# Patient Record
Sex: FEMALE | Race: OTHER | HISPANIC OR LATINO | Employment: FULL TIME | URBAN - METROPOLITAN AREA
[De-identification: names, ages, dates, MRNs, and addresses within clinical notes are randomized per-mention and may not be internally consistent; named-entity substitution may affect disease eponyms.]

---

## 2017-02-08 ENCOUNTER — HOSPITAL ENCOUNTER (EMERGENCY)
Facility: HOSPITAL | Age: 27
Discharge: HOME | End: 2017-02-08
Attending: EMERGENCY MEDICINE

## 2017-02-08 ENCOUNTER — APPOINTMENT (EMERGENCY)
Dept: RADIOLOGY | Facility: HOSPITAL | Age: 27
End: 2017-02-08

## 2017-02-08 VITALS
RESPIRATION RATE: 17 BRPM | OXYGEN SATURATION: 100 % | DIASTOLIC BLOOD PRESSURE: 86 MMHG | SYSTOLIC BLOOD PRESSURE: 126 MMHG | TEMPERATURE: 96.3 F | HEART RATE: 70 BPM

## 2017-02-08 DIAGNOSIS — N12 PYELONEPHRITIS: Primary | ICD-10-CM

## 2017-02-08 LAB
BACTERIA UR QL AUTO: ABNORMAL /HPF
BILIRUB UR QL STRIP: NEGATIVE
CLARITY UR: CLEAR
COLOR UR: YELLOW
COLOR, POC: NORMAL
GLUCOSE UR STRIP-MCNC: NEGATIVE MG/DL
HCG UR QL: NEGATIVE
HGB UR QL STRIP.AUTO: ABNORMAL
HYALINE CASTS #/AREA URNS LPF: ABNORMAL /LPF
KETONES UR STRIP-MCNC: NEGATIVE MG/DL
LEUKOCYTE ESTERASE UR QL STRIP: ABNORMAL
NITRITE UR QL STRIP: NEGATIVE
NON-SQ EPI CELLS URNS QL MICRO: ABNORMAL /HPF
PH UR STRIP.AUTO: 7 [PH] (ref 4.5–8)
PROT UR STRIP-MCNC: NEGATIVE MG/DL
RBC #/AREA URNS AUTO: ABNORMAL /HPF
SP GR UR STRIP.AUTO: 1.02 (ref 1–1.03)
UROBILINOGEN UR QL STRIP.AUTO: 1 E.U./DL
WBC #/AREA URNS AUTO: ABNORMAL /HPF

## 2017-02-08 PROCEDURE — 99284 EMERGENCY DEPT VISIT MOD MDM: CPT

## 2017-02-08 PROCEDURE — 87077 CULTURE AEROBIC IDENTIFY: CPT

## 2017-02-08 PROCEDURE — 81001 URINALYSIS AUTO W/SCOPE: CPT

## 2017-02-08 PROCEDURE — 87086 URINE CULTURE/COLONY COUNT: CPT

## 2017-02-08 PROCEDURE — 96372 THER/PROPH/DIAG INJ SC/IM: CPT

## 2017-02-08 PROCEDURE — 81002 URINALYSIS NONAUTO W/O SCOPE: CPT | Performed by: EMERGENCY MEDICINE

## 2017-02-08 PROCEDURE — 81025 URINE PREGNANCY TEST: CPT | Performed by: EMERGENCY MEDICINE

## 2017-02-08 PROCEDURE — 74176 CT ABD & PELVIS W/O CONTRAST: CPT

## 2017-02-08 PROCEDURE — 87186 SC STD MICRODIL/AGAR DIL: CPT

## 2017-02-08 RX ORDER — KETOROLAC TROMETHAMINE 30 MG/ML
15 INJECTION, SOLUTION INTRAMUSCULAR; INTRAVENOUS ONCE
Status: COMPLETED | OUTPATIENT
Start: 2017-02-08 | End: 2017-02-08

## 2017-02-08 RX ORDER — NAPROXEN 500 MG/1
500 TABLET ORAL 2 TIMES DAILY WITH MEALS
Qty: 60 TABLET | Refills: 0 | Status: SHIPPED | OUTPATIENT
Start: 2017-02-08 | End: 2017-02-24

## 2017-02-08 RX ORDER — CEPHALEXIN 500 MG/1
500 CAPSULE ORAL 4 TIMES DAILY
Qty: 40 CAPSULE | Refills: 0 | Status: SHIPPED | OUTPATIENT
Start: 2017-02-08 | End: 2017-02-18

## 2017-02-08 RX ORDER — ONDANSETRON 4 MG/1
4 TABLET, ORALLY DISINTEGRATING ORAL EVERY 8 HOURS PRN
Qty: 9 TABLET | Refills: 0 | Status: SHIPPED | OUTPATIENT
Start: 2017-02-08 | End: 2017-02-24

## 2017-02-08 RX ADMIN — KETOROLAC TROMETHAMINE 15 MG: 30 INJECTION, SOLUTION INTRAMUSCULAR at 01:21

## 2017-02-11 LAB — BACTERIA UR CULT: NORMAL

## 2017-02-24 ENCOUNTER — APPOINTMENT (EMERGENCY)
Dept: RADIOLOGY | Facility: HOSPITAL | Age: 27
End: 2017-02-24

## 2017-02-24 ENCOUNTER — HOSPITAL ENCOUNTER (EMERGENCY)
Facility: HOSPITAL | Age: 27
Discharge: HOME | End: 2017-02-24
Attending: EMERGENCY MEDICINE | Admitting: EMERGENCY MEDICINE

## 2017-02-24 VITALS
RESPIRATION RATE: 17 BRPM | OXYGEN SATURATION: 99 % | WEIGHT: 190 LBS | SYSTOLIC BLOOD PRESSURE: 140 MMHG | TEMPERATURE: 98.2 F | HEART RATE: 91 BPM | DIASTOLIC BLOOD PRESSURE: 84 MMHG

## 2017-02-24 DIAGNOSIS — R22.0 MANDIBULAR MASS: Primary | ICD-10-CM

## 2017-02-24 DIAGNOSIS — K04.7 DENTAL ABSCESS: ICD-10-CM

## 2017-02-24 LAB
ANION GAP BLD CALC-SCNC: 19 MMOL/L (ref 4–13)
BASOPHILS # BLD AUTO: 0.02 THOUSANDS/ΜL (ref 0–0.1)
BASOPHILS NFR BLD AUTO: 0 % (ref 0–1)
BUN BLD-MCNC: 13 MG/DL (ref 5–25)
CA-I BLD-SCNC: 1.2 MMOL/L (ref 1.12–1.32)
CHLORIDE BLD-SCNC: 102 MMOL/L (ref 100–108)
CREAT BLD-MCNC: 0.6 MG/DL (ref 0.6–1.3)
EOSINOPHIL # BLD AUTO: 0.04 THOUSAND/ΜL (ref 0–0.61)
EOSINOPHIL NFR BLD AUTO: 0 % (ref 0–6)
ERYTHROCYTE [DISTWIDTH] IN BLOOD BY AUTOMATED COUNT: 13.4 % (ref 11.6–15.1)
GFR SERPL CREATININE-BSD FRML MDRD: >60 ML/MIN/1.73SQ M
GLUCOSE SERPL-MCNC: 93 MG/DL (ref 65–140)
HCT VFR BLD AUTO: 39.3 % (ref 34.8–46.1)
HCT VFR BLD CALC: 41 % (ref 34.8–46.1)
HGB BLD-MCNC: 12.8 G/DL (ref 11.5–15.4)
HGB BLDA-MCNC: 13.9 G/DL (ref 11.5–15.4)
LYMPHOCYTES # BLD AUTO: 2.03 THOUSANDS/ΜL (ref 0.6–4.47)
LYMPHOCYTES NFR BLD AUTO: 19 % (ref 14–44)
MCH RBC QN AUTO: 29.3 PG (ref 26.8–34.3)
MCHC RBC AUTO-ENTMCNC: 32.6 G/DL (ref 31.4–37.4)
MCV RBC AUTO: 90 FL (ref 82–98)
MONOCYTES # BLD AUTO: 1.14 THOUSAND/ΜL (ref 0.17–1.22)
MONOCYTES NFR BLD AUTO: 11 % (ref 4–12)
NEUTROPHILS # BLD AUTO: 7.58 THOUSANDS/ΜL (ref 1.85–7.62)
NEUTS SEG NFR BLD AUTO: 70 % (ref 43–75)
NRBC BLD AUTO-RTO: 0 /100 WBCS
PCO2 BLD: 25 MMOL/L (ref 21–32)
PLATELET # BLD AUTO: 257 THOUSANDS/UL (ref 149–390)
PMV BLD AUTO: 10.5 FL (ref 8.9–12.7)
POTASSIUM BLD-SCNC: 4.5 MMOL/L (ref 3.5–5.3)
RBC # BLD AUTO: 4.37 MILLION/UL (ref 3.81–5.12)
SODIUM BLD-SCNC: 141 MMOL/L (ref 136–145)
SPECIMEN SOURCE: ABNORMAL
WBC # BLD AUTO: 10.82 THOUSAND/UL (ref 4.31–10.16)

## 2017-02-24 PROCEDURE — 70487 CT MAXILLOFACIAL W/DYE: CPT

## 2017-02-24 PROCEDURE — 85025 COMPLETE CBC W/AUTO DIFF WBC: CPT | Performed by: EMERGENCY MEDICINE

## 2017-02-24 PROCEDURE — 99284 EMERGENCY DEPT VISIT MOD MDM: CPT

## 2017-02-24 PROCEDURE — 36415 COLL VENOUS BLD VENIPUNCTURE: CPT | Performed by: EMERGENCY MEDICINE

## 2017-02-24 PROCEDURE — 85014 HEMATOCRIT: CPT

## 2017-02-24 PROCEDURE — 80047 BASIC METABLC PNL IONIZED CA: CPT

## 2017-02-24 PROCEDURE — 70355 PANORAMIC X-RAY OF JAWS: CPT

## 2017-02-24 PROCEDURE — 96365 THER/PROPH/DIAG IV INF INIT: CPT

## 2017-02-24 RX ORDER — CLINDAMYCIN HYDROCHLORIDE 150 MG/1
300 CAPSULE ORAL EVERY 6 HOURS
Qty: 56 CAPSULE | Refills: 0 | Status: SHIPPED | OUTPATIENT
Start: 2017-02-24 | End: 2017-03-03

## 2017-02-24 RX ORDER — CLINDAMYCIN HYDROCHLORIDE 150 MG/1
300 CAPSULE ORAL EVERY 6 HOURS
Qty: 56 CAPSULE | Refills: 0 | Status: CANCELLED | OUTPATIENT
Start: 2017-02-24 | End: 2017-03-03

## 2017-02-24 RX ORDER — CLINDAMYCIN PHOSPHATE 600 MG/50ML
600 INJECTION INTRAVENOUS ONCE
Status: COMPLETED | OUTPATIENT
Start: 2017-02-24 | End: 2017-02-24

## 2017-02-24 RX ADMIN — CLINDAMYCIN PHOSPHATE 600 MG: 12 INJECTION, SOLUTION INTRAMUSCULAR; INTRAVENOUS at 14:54

## 2017-02-24 RX ADMIN — IOHEXOL 50 ML: 350 INJECTION, SOLUTION INTRAVENOUS at 12:08

## 2017-05-24 ENCOUNTER — HOSPITAL ENCOUNTER (EMERGENCY)
Facility: HOSPITAL | Age: 27
Discharge: HOME | End: 2017-05-24
Admitting: EMERGENCY MEDICINE

## 2017-05-24 ENCOUNTER — APPOINTMENT (EMERGENCY)
Dept: RADIOLOGY | Facility: HOSPITAL | Age: 27
End: 2017-05-24

## 2017-05-24 VITALS
HEART RATE: 110 BPM | BODY MASS INDEX: 35.44 KG/M2 | SYSTOLIC BLOOD PRESSURE: 153 MMHG | HEIGHT: 63 IN | TEMPERATURE: 98.9 F | DIASTOLIC BLOOD PRESSURE: 89 MMHG | WEIGHT: 200 LBS | RESPIRATION RATE: 18 BRPM | OXYGEN SATURATION: 98 %

## 2017-05-24 DIAGNOSIS — L02.01 FACIAL ABSCESS: Primary | ICD-10-CM

## 2017-05-24 LAB
ANION GAP SERPL CALCULATED.3IONS-SCNC: 8 MMOL/L (ref 4–13)
BASOPHILS # BLD AUTO: 0.02 THOUSANDS/ΜL (ref 0–0.1)
BASOPHILS NFR BLD AUTO: 0 % (ref 0–1)
BUN SERPL-MCNC: 11 MG/DL (ref 5–25)
CALCIUM SERPL-MCNC: 9.2 MG/DL (ref 8.3–10.1)
CHLORIDE SERPL-SCNC: 105 MMOL/L (ref 100–108)
CO2 SERPL-SCNC: 25 MMOL/L (ref 21–32)
CREAT SERPL-MCNC: 0.66 MG/DL (ref 0.6–1.3)
EOSINOPHIL # BLD AUTO: 0.01 THOUSAND/ΜL (ref 0–0.61)
EOSINOPHIL NFR BLD AUTO: 0 % (ref 0–6)
ERYTHROCYTE [DISTWIDTH] IN BLOOD BY AUTOMATED COUNT: 13.2 % (ref 11.6–15.1)
GFR SERPL CREATININE-BSD FRML MDRD: >60 ML/MIN/1.73SQ M
GLUCOSE SERPL-MCNC: 101 MG/DL (ref 65–140)
HCG UR QL: NEGATIVE
HCT VFR BLD AUTO: 41 % (ref 34.8–46.1)
HGB BLD-MCNC: 13.5 G/DL (ref 11.5–15.4)
LYMPHOCYTES # BLD AUTO: 1.88 THOUSANDS/ΜL (ref 0.6–4.47)
LYMPHOCYTES NFR BLD AUTO: 17 % (ref 14–44)
MCH RBC QN AUTO: 29.8 PG (ref 26.8–34.3)
MCHC RBC AUTO-ENTMCNC: 32.9 G/DL (ref 31.4–37.4)
MCV RBC AUTO: 91 FL (ref 82–98)
MONOCYTES # BLD AUTO: 1.12 THOUSAND/ΜL (ref 0.17–1.22)
MONOCYTES NFR BLD AUTO: 10 % (ref 4–12)
NEUTROPHILS # BLD AUTO: 8.22 THOUSANDS/ΜL (ref 1.85–7.62)
NEUTS SEG NFR BLD AUTO: 73 % (ref 43–75)
NRBC BLD AUTO-RTO: 0 /100 WBCS
PLATELET # BLD AUTO: 263 THOUSANDS/UL (ref 149–390)
PMV BLD AUTO: 10.9 FL (ref 8.9–12.7)
POTASSIUM SERPL-SCNC: 4.3 MMOL/L (ref 3.5–5.3)
RBC # BLD AUTO: 4.53 MILLION/UL (ref 3.81–5.12)
SODIUM SERPL-SCNC: 138 MMOL/L (ref 136–145)
WBC # BLD AUTO: 11.27 THOUSAND/UL (ref 4.31–10.16)

## 2017-05-24 PROCEDURE — 85025 COMPLETE CBC W/AUTO DIFF WBC: CPT | Performed by: PHYSICIAN ASSISTANT

## 2017-05-24 PROCEDURE — 81025 URINE PREGNANCY TEST: CPT | Performed by: PHYSICIAN ASSISTANT

## 2017-05-24 PROCEDURE — 36415 COLL VENOUS BLD VENIPUNCTURE: CPT | Performed by: PHYSICIAN ASSISTANT

## 2017-05-24 PROCEDURE — 80048 BASIC METABOLIC PNL TOTAL CA: CPT | Performed by: PHYSICIAN ASSISTANT

## 2017-05-24 PROCEDURE — 70487 CT MAXILLOFACIAL W/DYE: CPT

## 2017-05-24 PROCEDURE — 99284 EMERGENCY DEPT VISIT MOD MDM: CPT

## 2017-05-24 RX ADMIN — IOHEXOL 100 ML: 350 INJECTION, SOLUTION INTRAVENOUS at 13:39

## 2017-08-24 ENCOUNTER — GENERIC CONVERSION - ENCOUNTER (OUTPATIENT)
Dept: OTHER | Facility: OTHER | Age: 27
End: 2017-08-24

## 2017-11-06 ENCOUNTER — HOSPITAL ENCOUNTER (EMERGENCY)
Facility: HOSPITAL | Age: 27
Discharge: HOME | End: 2017-11-06
Attending: EMERGENCY MEDICINE | Admitting: EMERGENCY MEDICINE
Payer: MEDICARE

## 2017-11-06 VITALS
DIASTOLIC BLOOD PRESSURE: 69 MMHG | HEART RATE: 55 BPM | OXYGEN SATURATION: 97 % | RESPIRATION RATE: 16 BRPM | SYSTOLIC BLOOD PRESSURE: 122 MMHG | WEIGHT: 207 LBS | BODY MASS INDEX: 36.67 KG/M2 | TEMPERATURE: 98 F

## 2017-11-06 DIAGNOSIS — R06.4 HYPERVENTILATION: ICD-10-CM

## 2017-11-06 DIAGNOSIS — F43.21 GRIEF: Primary | ICD-10-CM

## 2017-11-06 PROCEDURE — 99283 EMERGENCY DEPT VISIT LOW MDM: CPT

## 2017-11-06 RX ORDER — LORAZEPAM 0.5 MG/1
1 TABLET ORAL ONCE
Status: COMPLETED | OUTPATIENT
Start: 2017-11-06 | End: 2017-11-06

## 2017-11-06 RX ORDER — LORAZEPAM 1 MG/1
1 TABLET ORAL EVERY 8 HOURS PRN
Qty: 2 TABLET | Refills: 0 | Status: SHIPPED | OUTPATIENT
Start: 2017-11-06 | End: 2018-02-22 | Stop reason: ALTCHOICE

## 2017-11-06 RX ADMIN — LORAZEPAM 1 MG: 0.5 TABLET ORAL at 11:49

## 2017-11-06 NOTE — ED ATTENDING ATTESTATION
Amina Santos MD, saw and evaluated the patient  I have discussed the patient with the resident/non-physician practitioner and agree with the resident's/non-physician practitioner's findings, Plan of Care, and MDM as documented in the resident's/non-physician practitioner's note, except where noted  All available labs and Radiology studies were reviewed  At this point I agree with the current assessment done in the Emergency Department  I have conducted an independent evaluation of this patient a history and physical is as follows:  50-year-old patient presenting to the emergency department with anxiety  Patient's mother was pronounced dead today up in the ICU, and she has been very upset  Additionally, the patient has been having chronic daily headaches for the last several months, and is concerned about these, as her mother  of intracranial hemorrhage  The patient does not have headache currently  She states she takes Excedrin every single day for her headache  She has not been evaluated for the past   The patient denies any numbness, tingling, weakness, vomiting, abdominal pain, or current headache  She is otherwise healthy  Her review of systems otherwise negative in 12 systems were reviewed  On exam the patient is tearful and upset  Her HEENT exam is unremarkable  Her pupils are round reactive to light  Oropharynx is clear with moist mucous membranes  Her cranial nerves are intact  Neck is supple and nontender with no adenopathy  Her heart is regular with no murmurs, rubs, or gallops  Lungs are clear and equal with no wheezes, rales, rhonchi  Abdomen is soft and nontender with no masses, rebound, or guarding  Her extremities are warm and well perfused  The patient has a steady gait with normal strength and sensation  Impression:  Chronic daily headache, grief reaction    Discussed with patient appropriate management for chronic daily headache, encouraged decrease use of over-the-counter medications, encouraged follow-up with primary care doctor, do not feel there is an indication for CT at this time  Patient given Ativan here for symptomatic relief  Discussed with patient that normal grieving still will occur, and there is no medication that ameliorate this  Crisis saw patient, provided with outpatient resources    Patient denies suicidal ideation, homicidal ideation    Critical Care Time  CritCare Time

## 2017-11-06 NOTE — DISCHARGE INSTRUCTIONS
Grief and Loss   WHAT YOU NEED TO KNOW:   Grief involves feelings of sadness and suffering after the loss of a loved one  It is a normal and healthy emotional response to a loss  DISCHARGE INSTRUCTIONS:   Stages of grief:   · Shock, numbness, and denial:  Even if the death of a loved one was expected, it may still come as a shock  Shock may leave you feeling numb emotionally, which may last for hours to days  You may also find it hard to accept that someone close to you has   · Yearning and searching:  During this time, you may get angry easily and feel anxious  You may try to hold onto the memories of the person who   You may feel guilty because of unfinished business at the time of his death  You may not have said all the things you want to say to your loved one  You may feel guilty for being the one who is still alive  · Disorganized and despair:  You may feel confused, lonely, and depressed  You may feel as though the pain and despair will not go away  There may be times that you separate yourself from your family or friends  · Reorganization:  As time passes, you may learn to accept the changes in your life  You may finally be able to say goodbye to the person  Ways to cope: The pain of the grief process can be difficult  You may feel angry, sad, or confused  Anything that might remind you of the loss can trigger these feelings  Events, anniversaries of special times, birthdays, and holidays may also bring these emotions  The following may help you cope with the death of a loved one:  · Give yourself plenty of time and rest:  Allow yourself time to heal  Grief is not something you can rush  It may take years to heal from your loss  Ask your family, friends, and healthcare providers for help  · Share your thoughts and feelings:  Try saying what you really feel or share stories of the one who just passed away   Often just talking to someone you trust, or crying when you need to can be a big help  · Seek hospice services:  Hospice services work with the person during his remaining days, and also help the person's loved ones  Hospice care prepares you for your loss and offers continued help through grief programs after the person's death  Healthcare providers provide support for survivors, and check if grief counseling or psychiatric help are needed  These services give support through sad times after the death  For support and more information:   · [facility] [facility], [facility] [address] [place] [address] , [address] [place] [address]  Phone: [phone]  Web Address: http://BiOM/  [employer]  Contact your healthcare provider if:   · You cannot eat, drink, or take your medicines  · You feel more depressed or sad most of the time, or these feelings do not go away  · You need to talk about your problems and feelings  · You have problems with your bereavement and grief  · You have questions or concerns about your condition or care  Return to the emergency department if:   · You feel like hurting yourself or someone else  · You are anxious or restless even after you take your medicines  · You feel that you cannot cope with your condition  · You have problems sleeping  · You have trouble breathing, chest pain, or a fast heartbeat  © 2017 [facility] Information is for End User's use only and may not be sold, redistributed or otherwise used for commercial purposes  All illustrations and images included in CareNotes® are the copyrighted property of A D A M , Inc  or Josse Medina  The above information is an  only  It is not intended as medical advice for individual conditions or treatments  Talk to your doctor, nurse or pharmacist before following any medical regimen to see if it is safe and effective for you      Hyperventilation   WHAT YOU NEED TO KNOW:   Hyperventilation is when your breathing is faster than normal  This fast breathing may cause you to also have other symptoms  Hyperventilation may be caused by anxiety, stress, or panic  Other causes include medicines, imbalances in the chemicals in your body, and too much caffeine  DISCHARGE INSTRUCTIONS:   Medicines:   · Antianxiety medicine: This medicine may be given to decrease anxiety and help you feel calm and relaxed  · Take your medicine as directed  Contact your healthcare provider if you think your medicine is not helping or if you have side effects  Tell him of her if you are allergic to any medicine  Keep a list of the medicines, vitamins, and herbs you take  Include the amounts, and when and why you take them  Bring the list or the pill bottles to follow-up visits  Carry your medicine list with you in case of an emergency  Follow up with your healthcare provider as directed:  Write down your questions so you remember to ask them during your visits  Manage hyperventilation:   · Stress management:  Learn about the stressors that cause you to hyperventilate  Find other ways to manage your response to stress  Deep breathing, relaxing muscles, or meditation may help you  Do not use the home remedy of breathing into a paper bag  This can be very dangerous, because it may cause a lack of oxygen  · Counseling: You may need help to manage the stress or anxiety that is causing your hyperventilation  Your healthcare provider may suggest that you see a counselor to talk about how you are feeling  Contact your healthcare provider if:   · Your symptoms do not get better, or they get worse  · You have questions or concerns about your condition or care  Return to the emergency department if:   · You have a seizure  · You feel like you are going to faint  · You have chest pain  © 2017 [employer] Information is for End User's use only and may not be sold, redistributed or otherwise used for commercial purposes   All illustrations and images included in [facility] are the copyrighted property of [employer] , Inc  or [address]  The above information is an  only  It is not intended as medical advice for individual conditions or treatments  Talk to your doctor, nurse or pharmacist before following any medical regimen to see if it is safe and effective for you

## 2017-11-09 NOTE — ED PROVIDER NOTES
History  Chief Complaint   Patient presents with    Anxiety     pt found out today family member is pronounced brain dead, reports feeling anxious  Patient is a 33year old F who presents with anxiety and hyperventilation  Patient was upstairs in the ICU and was present when her mother was pronounced brain dead secondary to  [address]  Patient started to hyperventilate, cry and then her fingers and toes began to feel tingly resulting in her being brought to the emergency department for further evaluation  Patient reports that the tingling is lessening, but she is still extremely anxious and sad with heart racing  Patient also reports that she gets daily headaches that she describes as always the same, squeezing sensation for which she takes excedrin  Patient is worried about her headaches especially now with the fact that her mother  of ICH  Denies headache at present time  Denies SI/HI/AH/VH  Assessment and Plan: #1 Anxiety 2/2 grief  Will treat with ativan in the ED  Counseled regarding normal grief reaction and recommended crisis to evaluate in order to give outpatient resources for future counseling as grief will be a long process  #2 chronic headaches  Discussed importance of follow up with PCP for management of chronic headache  Given return precautions  None       Past Medical History:   Diagnosis Date    Migraine        Past Surgical History:   Procedure Laterality Date    CHOLECYSTECTOMY         History reviewed  No pertinent family history  I have reviewed and agree with the history as documented  Social History   Substance Use Topics    Smoking status: Never Smoker    Smokeless tobacco: Never Used    Alcohol use No        Review of Systems   Constitutional: Negative for chills and fever  HENT: Negative for congestion  Eyes: Negative for photophobia and visual disturbance  Respiratory: Negative for shortness of breath      Cardiovascular: Negative for chest pain and palpitations  Gastrointestinal: Negative for abdominal pain, diarrhea, nausea and vomiting  Genitourinary: Negative for dysuria and hematuria  Musculoskeletal: Negative for back pain, neck pain and neck stiffness  Skin: Negative for pallor and rash  Neurological: Positive for headaches  Negative for dizziness, seizures, weakness, light-headedness and numbness  Psychiatric/Behavioral: Negative for suicidal ideas  Physical Exam  ED Triage Vitals   Temperature Pulse Respirations Blood Pressure SpO2   11/06/17 1128 11/06/17 1128 11/06/17 1128 11/06/17 1128 11/06/17 1128   98 °F (36 7 °C) 70 16 152/83 98 %      Temp Source Heart Rate Source Patient Position - Orthostatic VS BP Location FiO2 (%)   11/06/17 1128 -- -- 11/06/17 1128 --   Tympanic   Left arm       Pain Score       11/06/17 1132       No Pain           Orthostatic Vital Signs  Vitals:    11/06/17 1128 11/06/17 1235 11/06/17 1307   BP: 152/83 117/78 122/69   Pulse: 70 56 55       Physical Exam   Constitutional: She is oriented to person, place, and time  She appears well-developed and well-nourished  No distress  HENT:   Head: Normocephalic and atraumatic  Right Ear: External ear normal    Left Ear: External ear normal    Nose: Nose normal    Mouth/Throat: Oropharynx is clear and moist  No oropharyngeal exudate  Eyes: Conjunctivae and EOM are normal  Pupils are equal, round, and reactive to light  Neck: Normal range of motion  Neck supple  Cardiovascular: Normal rate, regular rhythm, normal heart sounds and intact distal pulses  Exam reveals no gallop and no friction rub  No murmur heard  Pulmonary/Chest: Effort normal and breath sounds normal  No respiratory distress  She has no wheezes  She has no rales  She exhibits no tenderness  Abdominal: Soft  Bowel sounds are normal  She exhibits no distension  There is no tenderness  There is no guarding  Musculoskeletal: Normal range of motion  She exhibits no edema or tenderness  Neurological: She is alert and oriented to person, place, and time  She has normal strength and normal reflexes  No cranial nerve deficit or sensory deficit  She exhibits normal muscle tone  Coordination and gait normal  GCS eye subscore is 4  GCS verbal subscore is 5  GCS motor subscore is 6  Skin: Skin is warm and dry  Capillary refill takes less than 2 seconds  No rash noted  She is not diaphoretic  No erythema  No pallor  Psychiatric: She expresses no homicidal and no suicidal ideation  She expresses no suicidal plans and no homicidal plans  tearful   Nursing note and vitals reviewed  ED Medications  Medications   LORazepam (ATIVAN) tablet 1 mg (1 mg Oral Given 11/6/17 1149)       Diagnostic Studies  Results Reviewed     None                 No orders to display         Procedures  Procedures      Phone [address]  ED Phone Contact    ED Course  ED Course                                MDM  CritCare Time    Disposition  Final diagnoses:   Grief   Hyperventilation     Time reflects when diagnosis was documented in both MDM as applicable and the Disposition within this note     Time User Action Codes Description Comment    11/6/2017  1:14 PM Liz Gee [F43 20] Grief     11/6/2017  1:14 PM Aung Mendoza [R06 4] Hyperventilation       ED Disposition     ED Disposition Condition Comment    Discharge  Karuna Pill discharge to home/self care      Condition at discharge: Good        Follow-up Information     Follow up With Specialties Details Why Contact Info Additional Information    Juan Lopez MD Internal Medicine Schedule an appointment as soon as possible for a visit in 2 days for re-evaluation [address]  [address] [facility] Emergency Department Emergency Medicine Go to for re-evaluation, As needed, If symptoms worsen [address] [id number]  [facility], [address] [place] [address], [address] Discharge Medication List as of 11/6/2017  1:16 PM      START taking these medications    Details   LORazepam (ATIVAN) 1 mg tablet Take 1 tablet by mouth every 8 (eight) hours as needed for anxiety for up to 2 days, Starting Mon 11/6/2017, Until Wed 11/8/2017, Print           No discharge procedures on file  ED Provider  Attending physically available and evaluated Joseph Brand  LLOYD managed the patient along with the ED Attending      Electronically Signed by         Maegan Huston DO  Resident  11/09/17 4007

## 2018-01-12 NOTE — PROGRESS NOTES
Assessment    1  History of Delivered by  section (669 70) (O82)   2  History of cholelithiasis (V12 79) (Z87 19)   3  History of migraine (V12 49) (Z86 69)   4  History of Venereal disease due to Chlamydia trachomatis (099 50) (A56 8)   5  Amenorrhea (626 0) (N91 2)    Plan  Amenorrhea, Contraception management    · Follow-up visit in 3 months Evaluation and Treatment  Follow-up  Status: Hold For -  Scheduling  Requested for: 69EQB0196   Ordered; For: Amenorrhea, Contraception management; Ordered By: Radha Roberts Performed:  Due: 84NCG0831  Contraception management    · Depo-Provera 150 MG/ML Intramuscular Suspension  (MedroxyPROGESTERone Acetate)   Rx By: Radha Roberts; For: Contraception management; ANU = N; Administered: 2016 12:00:00 AM    Discussion/Summary  Discussion Summary:   25 yo  with amenorrhea for contraception visit  1) amenorrhea- labs WNL aside from DHEA-S  Amenorrhea likely hypotholamic as all other test results are WNL  No further workup necessary at this time  2) Contraception Visit- Patient would like Depo today  UPT negative  Counseled on Nexplanon and other LARC methods  Will rtc in 12 weeks for depo  May consider nexplanon at that time  Aware of risks and benefits including abnormal bleeding, parasthesias, and infections  Patient agreeable, and pamphlet given  -RTC in 3 months  Chief Complaint  Chief Complaint Free Text Note Form: results   Chief Complaint Chronic Condition [facility]: Patient is here today for follow up of chronic conditions described in HPI  History of Present Illness  HPI: 25 yo (c/sx2) with her last baby 2013 had her LMP before  currently not on contraception presents for results of amennorhea, and to receive birth control  Patient last received depo 6 months ago, and has been on and off depo since   She desires to stay on the depo  Patient has gained about 20 pounds over the past 2 years        Review of Systems   Female ROS: no pelvic pain, no pelvic pressure, no vaginal pain, no vaginal discharge, no vaginal itching, no vaginal lump or mass and no vaginal odor  Active Problems    1  Abnormal Pap smear of cervix (795 00) (R87 619)   2  Allergic rhinitis (477 9) (J30 9)   3  Amenorrhea, secondary (626 0) (N91 1)   4  Atopic dermatitis (691 8) (L20 9)   5  Cervical dysplasia (622 10) (N87 9)   6  Contraception management (V25 9) (Z30 9)   7  Diarrhea (787 91) (R19 7)   8  's permit physical examination (V70 3) (Z02 9)   9  Encounter for routine gynecological examination with Papanicolaou smear of cervix   (V72 31,V76 2) (Z01 419,Z12 4)   10  Impaired glucose tolerance test (790 22) (R73 02)   11  Need for influenza vaccination (V04 81) (Z23)   12  Obesity (278 00) (E66 9)    Past Medical History    1  History of Delivered by  section (669 70) (O82)   2  History of cholelithiasis (V12 79) (Z87 19)   3  History of migraine (V12 49) (Z86 69)   4  History of Venereal disease due to Chlamydia trachomatis (099 50) (A56 8)  Active Problems And Past Medical History Reviewed: The active problems and past medical history were reviewed and updated today  Pregnancy Medical History:   Medical history: No diabetes  No hypertension  No heart disease  No autoimmune disease  No urologic disorder  Surgical History    1  History of  Section Low Transverse   2  History of Gallbladder Surgery  Surgical History Reviewed: The surgical history was reviewed and updated today  Family History    1  Family history of diabetes mellitus (V18 0) (Z83 3)    2  Family history of Blood clot in vein   3  Family history of Elevated cholesterol    4  Family history of Breast Cancer (V16 3)    5  Family history of diabetes mellitus (V18 0) (Z83 3)    6   Family history of Breast Cancer (V16 3)    Social History    · Denied: History of Alcohol Use (History)   · Denied: History of Drug Use   · Never A Smoker    Current Meds   1  MedroxyPROGESTERone Acetate 150 MG/ML Intramuscular Suspension; inject every   11wks; To Be Done: 50ARM5680; Status: HOLD FOR -   Administration Ordered   2  MedroxyPROGESTERone Acetate 150 MG/ML Intramuscular Suspension; inject every   11wks; To Be Done: 04Wnd4880; Status: HOLD FOR -   Administration Ordered   3  MedroxyPROGESTERone Acetate 150 MG/ML Intramuscular Suspension; inject every   11wks; To Be Done: 42BQZ0557; Status: HOLD FOR -   Administration Ordered    Allergies    1  Latex Exam Gloves MISC    2  Latex   3  No Known Environmental Allergies   4  No Known Food Allergies    Vitals  Vital Signs [Data Includes: Current Encounter]    Recorded: 15GNN2542 03:11PM   Weight 195 lb 9 6 oz   LMP depo     Results/Data  Results   (1) TESTOSTERONE 72ORY9587 09:15PM [employer] Order Number: [id number]    [employer] Order Number: [id number] Order Number: [id number] Order Number: [id number]     Test Name Result Flag Reference   TESTOSTERONE 41 80 ng/dL  14-76     (1) [facility] 90CWT8968 12:19PM [employer] Order Number: [id number]    Menstruating Females: Follicular Phase   5 9-40 5 mIU/mL     Mid-cycle Phase    5 2-17 5 mIU/mL     Luteal Phase       1 7-9 5  mIU/mL   Postmenopausal Females    On Menopausal Hormone Therapy (HRT) 5 9-72 8 mIU/mL    Untreated                           12 7-132 2 mIU/mL     Test Name Result Flag Reference   FOLLICLE STIMULATING HORMONE 6 1 mIU/mL       (1) LH (LEUTINIZING HORMONE) 07VRE6228 12:19PM [employer] Order Number: [id number]    Menstruating Females:     Follicular phase 6 5-87 0 mIU/mL    Mid-cycle phase  22 8-76 1 mIU/mL    Luteal phase     0 6-13 5 mIU/mL  Postmemopausal Females     On Menopausal Hormone Therapy (MHT) 1 1-52 4 mIU/mL    Untreated                           8 6-61 8 mIU/mL     Test Name Result Flag Reference   LUTEINIZING HORMONE 7 8 mIU/mL       (1) TSH 25DXG8073 12:05PM [employer] Order Number: [id number]    Patients undergoing fluorescein dye angiography may retain small amounts of fluorescein in the body for 48-72 hours post procedure  Samples containing fluorescein can produce falsely depressed TSH values  If the patient had this procedure,a specimen should be resubmitted post fluorescein clearance  The recommended reference ranges for TSH during pregnancy are as follows:  First trimester 0 1 to 2 5 uIU/mL  Second trimester  0 2 to 3 0 uIU/mL  Third trimester 0 3 to 3 0 uIU/m     Test Name Result Flag Reference   TSH 1 390 uIU/mL  0 358-3 740     (1) PROLACTIN 91LRM9715 12:05PM [employer] Order Number: [id number]    Females:    Non-pregnant    2 2-30 3  ng/mL  Pregnant        8 1-347 6 ng/mL  Post-menopausal 0 7-31 5  ng/mL     Test Name Result Flag Reference   PROLACTIN 8 6 ng/mL  3 0-13 4     (1) DHEA-S 47YOM7710 10:55AM [employer] Order Number: [id number]    Performed at:  [address] [place] [address]  [id number]  : Kevin Reveles MD, Phone:  [phone]  Specimen Comment: A duplicate report has been generated due to demographic   updates       Test Name Result Flag Reference   DHEA-SULFATE 594 9 ug/dL H 110 0 - 431 7     Signatures   Electronically signed by : MARY Naik ; Jan 25 2016  4:00PM EST                       (Author)    Electronically signed by : Lane Feng MD; Feb 4 2016  8:26AM EST

## 2018-01-13 NOTE — MISCELLANEOUS
Provider Comments  Provider Comments:   pt n/s for her depo   i was unable to leave a message      Signatures   Electronically signed by : Boy Finnegan, ; Feb 23 2017  4:09PM EST                       (Author)

## 2018-01-15 NOTE — MISCELLANEOUS
Message  CALLED PATIENT TO RESCHEDULE MISSED APPT  SPOKE TO PATIENTS GRANDMOTHER  Active Problems    1  Abnormal Pap smear of cervix (795 00) (R87 619)   2  Allergic rhinitis (477 9) (J30 9)   3  Amenorrhea (626 0) (N91 2)   4  Amenorrhea, secondary (626 0) (N91 1)   5  Encounter for contraceptive management (V25 9) (Z30 9)   6  Impaired glucose tolerance test (790 22) (R73 02)   7  Need for Tdap vaccination (V06 1) (Z23)   8  Obesity (278 00) (E66 9)    Current Meds   1  Fluticasone Propionate 50 MCG/ACT Nasal Suspension; USE 1 SPRAY IN EACH   NOSTRIL TWICE DAILY; Therapy: 86WPZ9449 to (Rima Leonard)  Requested for: 07FLE2526; Last   Rx:26May2016 Ordered   2  Ketotifen Fumarate 0 025 % Ophthalmic Solution; INSTILL 1 DROP IN THE AFFECTED   EYE(S) EVERY 12 HOURS AS NEEDED; Therapy: 85GZP1111 to (Luis Fernando Larsen)  Requested for: 55AAM7793; Last   Rx:87Wee5125 Ordered   3  Loratadine 10 MG Oral Tablet; TAKE 1 TABLET BY MOUTH EVERY DAY; Therapy: 67MFJ1620 to (Rima Leonard)  Requested for: 70BTA6844; Last   Rx:78Rug2241 Ordered   4  MedroxyPROGESTERone Acetate 150 MG/ML Intramuscular Suspension   (Depo-Provera); INJECT INTRAMUSCULARLY AS DIRECTED; Therapy: 30AID8610 to (Last Rx:25Oct2016)  Requested for:   48Xih0432 Ordered    Allergies    1  Latex Exam Gloves MISC    2  Latex   3  No Known Environmental Allergies   4   No Known Food Allergies    Signatures   Electronically signed by : March Maize, ; Aug 24 2017  8:41AM EST                       (Author)

## 2018-02-22 ENCOUNTER — HOSPITAL ENCOUNTER (EMERGENCY)
Facility: HOSPITAL | Age: 28
Discharge: HOME | End: 2018-02-22
Attending: EMERGENCY MEDICINE
Payer: MEDICARE

## 2018-02-22 VITALS
WEIGHT: 210 LBS | BODY MASS INDEX: 35.85 KG/M2 | OXYGEN SATURATION: 99 % | TEMPERATURE: 97.9 F | HEIGHT: 64 IN | HEART RATE: 64 BPM | RESPIRATION RATE: 18 BRPM | DIASTOLIC BLOOD PRESSURE: 75 MMHG | SYSTOLIC BLOOD PRESSURE: 124 MMHG

## 2018-02-22 DIAGNOSIS — T78.40XA ALLERGIC REACTION, INITIAL ENCOUNTER: Primary | ICD-10-CM

## 2018-02-22 PROCEDURE — 99283 EMERGENCY DEPT VISIT LOW MDM: CPT

## 2018-02-22 RX ORDER — FAMOTIDINE 20 MG/1
20 TABLET, FILM COATED ORAL 2 TIMES DAILY
Qty: 30 TABLET | Refills: 0 | Status: SHIPPED | OUTPATIENT
Start: 2018-02-22 | End: 2018-05-23

## 2018-02-22 RX ORDER — PREDNISONE 20 MG/1
60 TABLET ORAL ONCE
Status: COMPLETED | OUTPATIENT
Start: 2018-02-22 | End: 2018-02-22

## 2018-02-22 RX ORDER — EPINEPHRINE 0.3 MG/.3ML
0.3 INJECTION SUBCUTANEOUS ONCE
Qty: 0.3 ML | Refills: 0 | Status: SHIPPED | OUTPATIENT
Start: 2018-02-22 | End: 2018-05-23

## 2018-02-22 RX ORDER — FAMOTIDINE 20 MG/1
20 TABLET, FILM COATED ORAL ONCE
Status: COMPLETED | OUTPATIENT
Start: 2018-02-22 | End: 2018-02-22

## 2018-02-22 RX ORDER — DIPHENHYDRAMINE HCL 25 MG
50 TABLET ORAL ONCE
Status: COMPLETED | OUTPATIENT
Start: 2018-02-22 | End: 2018-02-22

## 2018-02-22 RX ORDER — DIPHENHYDRAMINE HCL 25 MG
50 CAPSULE ORAL EVERY 8 HOURS PRN
Qty: 20 CAPSULE | Refills: 0 | Status: SHIPPED | OUTPATIENT
Start: 2018-02-22 | End: 2018-05-23

## 2018-02-22 RX ADMIN — FAMOTIDINE 20 MG: 20 TABLET, FILM COATED ORAL at 11:36

## 2018-02-22 RX ADMIN — DIPHENHYDRAMINE HCL 50 MG: 25 TABLET ORAL at 11:36

## 2018-02-22 RX ADMIN — PREDNISONE 60 MG: 20 TABLET ORAL at 11:37

## 2018-02-22 NOTE — DISCHARGE INSTRUCTIONS
You were seen today for an allergic reaction  It is unclear what you are allergic to  Given that the symptoms seemed to happen after work, there is concerned that this may be something that were exposed to at your place of work  As her symptoms have been recurring, it is important that you keep an EpiPen on you at all times  You should use her EpiPen if you have any difficulty breathing after 1 of these exposures  You should return to the emergency department for shortness of breath, worsening of your symptoms, or any other concerns  You should follow-up through [address] [place] [address] in full length to arrange an appointment with an allergist to better ascertain what you are allergic to  General Allergic Reaction   AMBULATORY CARE:   A general allergic reaction  is your body's response to an allergen  Allergens include medicines, food, insect stings, animal dander, mold, latex, chemicals, and dust mites  Pollen from trees, grass, and weeds can also cause an allergic reaction  Seek care immediately if:   · You have a skin rash, hives, swelling, or itching that gets worse  · You have trouble breathing, shortness of breath, wheezing, or coughing  · Your throat tightens, or your lips or tongue swell  · You have trouble swallowing or speaking  · You have dizziness, lightheadedness, fainting, or confusion  · You have nausea, vomiting, diarrhea, or abdominal cramps  · You have chest pain or tightness  Contact your healthcare provider if:   · You have questions or concerns about your condition or care  Treatment for a general allergic reaction  may include medicines to relieve certain allergy symptoms such as itching, sneezing, and swelling  You may take them as a pill or use drops in your nose or eyes  Topical treatments may be given to put directly on your skin to help decrease itching or swelling    Manage allergic reactions:   · Avoid the allergen  that you think may have caused your allergic reaction  · Use cold compresses  on your skin or eyes if they were affected by the allergic reaction  Cold compresses may help to soothe your skin or eyes  · Rinse your nasal passages  with a saline solution  Daily rinsing may help clear your nose of allergens  · Do not smoke  Your allergy symptoms may decrease if you are not around smoke  Nicotine and other chemicals in cigarettes and cigars can also cause lung damage  Ask your healthcare provider for information if you currently smoke and need help to quit  E-cigarettes or smokeless tobacco still contain nicotine  Talk to your healthcare provider before you use these products  Follow up with your healthcare provider as directed:  Write down your questions so you remember to ask them during your visits  © 2017 [address] Information is for End User's use only and may not be sold, redistributed or otherwise used for commercial purposes  All illustrations and images included in CareNotes® are the copyrighted property of A D A M [employer]  or Josse Medina  The above information is an  only  It is not intended as medical advice for individual conditions or treatments  Talk to your doctor, nurse or pharmacist before following any medical regimen to see if it is safe and effective for you  Epinephrine (By injection)   Epinephrine (bn-v-KAB-rin)  Treats severe allergic reactions (including anaphylaxis) in an emergency situation  Brand Name(s): Adrenaclick, Adrenalin, Adyphren, Adyphren Amp II Kit, Adyphren Amp Kit, Adyphren II, Auvi-Q, EPINEPHrinesnap, EpiPen, EpiPen Auto-Injector, EpiPen Jr Auto-Injector, Epinephrine Novaplus, Epipen 2-Raúl Auto-Injector, Epipen Jr 2-Raúl Auto-Injector   There may be other brand names for this medicine  When This Medicine Should Not Be Used:   A severe allergic reaction can be life-threatening, so there is no reason this medicine should not be used    How to Use This Medicine:   Injectable  · Your doctor should teach you how and when to inject this medicine  Each injection kit contains a single-use dose of medicine prescribed for you  · Give yourself a shot right away if you start to have a severe allergic reaction  · Inject this medicine into the muscle on the outside of your thigh only  Never inject this medicine into a vein, into your hand or foot, or into the muscles of your buttocks  · Read and follow the patient instructions that come with this medicine  Talk to your doctor or pharmacist if you have any questions  · This medicine might come with an autoinjector  so you can practice giving the medicine before you have an actual allergic reaction  The autoinjector  is gray (for EpiPen® or EpiPen Jr®) or beige (for [place]) and does not contain any medicine or needle  · Do not remove the blue safety release (EpiPen® or EpiPen Jr®) or the gray end caps (Adrenaclick®) on the autoinjector until you are ready to use it  Do not put your thumb, fingers, or hand over the orange (EpiPen® or EpiPen Jr®) or red tip (Adrenaclick®)  · You may need to use more than one injection if your allergic reaction does not get better after the first shot  Your doctor will give you additional doses if you need more than 2 injections  · You may inject the medicine through your clothing, if you need to  · Some liquid will remain in the autoinjector after the medicine has been injected  This medicine cannot be reused  Give your used autoinjector to your healthcare provider when you seek medical care  · Carry this medicine with you at all times for emergency use in case you have a severe allergic reaction  · Make sure family members or other people you are with know how to inject the medicine in case you are not able to do it yourself  · Check your injection kits regularly to make sure the liquid has not changed color   You should not use the autoinjector if the liquid has changed color, or if there are solids in the liquid  You should not use the autoinjector if the expiration date has passed  · If you are using the epinephrine injection in a child, make sure to hold his leg firmly in place and limit movement before and during an injection  · Store the injection kit at room temperature, away from heat, moisture, and direct light  Do not store the medicine in the refrigerator or freezer, or inside a car  · Keep the autoinjector in its case or carrier tube to protect it from damage  This tube is not waterproof  If you accidentally drop it, check for damage or leaks  Drugs and Foods to Avoid:   Ask your doctor or pharmacist before using any other medicine, including over-the-counter medicines, vitamins, and herbal products  · Some foods and medicines can affect how epinephrine works  Tell your doctor if you are using any of the following:  ¨ Digoxin, levothyroxine, phentolamine  ¨ Blood pressure medicine  ¨ Certain allergy medicines (including chlorpheniramine, diphenhydramine, tripelennamine)  ¨ Diuretic (water pill)  ¨ Ergot medicines  ¨ Medicine for depression (including MAO inhibitor)  ¨ Medicine for heart rhythm problem  Warnings While Using This Medicine:   · Tell your doctor if you are pregnant or breastfeeding, or if you have asthma, diabetes, heart disease, heart rhythm problems, high blood pressure, an overactive thyroid, or Parkinson disease  · A severe allergic reaction is a medical emergency  Go to an emergency room as soon as possible, even if you feel better after you use this medicine  · Do not inject this medicine into your hands or feet  Go to the emergency room right away if you accidently inject epinephrine into any part of your body other than your thigh  Epinephrine reduces blood flow, and this could damage areas that have small blood vessels, such as hands and feet  · Keep all medicine out of the reach of children   Never share your medicine with anyone  Possible Side Effects While Using This Medicine:   Call your doctor right away if you notice any of these side effects:  · Chest pain  · Fast, pounding, or uneven heartbeat  · Heavy sweating, nausea, vomiting  · Pain, redness, or warmth at the injection site  · Tremors, shakiness  · Trouble breathing  If you notice these less serious side effects, talk with your doctor:   · Feeling anxious, nervous, scared, or weak  · Headache or dizziness  · Pale skin  If you notice other side effects that you think are caused by this medicine, tell your doctor  Call your doctor for medical advice about side effects  You may report side effects to FDA at 5-818-FRE-4492  © 2017 [facility] Information is for End User's use only and may not be sold, redistributed or otherwise used for commercial purposes  The above information is an  only  It is not intended as medical advice for individual conditions or treatments  Talk to your doctor, nurse or pharmacist before following any medical regimen to see if it is safe and effective for you  Diphenhydramine (By mouth)   Diphenhydramine (dye-fen-MONISHA-pilar-meen)  Treats hay fever, allergy, and cold symptoms  Also treats insomnia  Brand Name(s): Aller-G-Time, Allergy Relief, Allermax, Anti-Hist, Banophen, Benadryl Allergy, Children's Allergy, Children's Benadryl Allergy, Children's Wal-Dryl Allergy, Complete Allergy, Complete Allergy Medicine, Contrast Allergy PreMed Pack, Diphen, Diphenhist, Dormin Sleep Aid   There may be other brand names for this medicine  When This Medicine Should Not Be Used: You should not use this medicine if you have ever had an allergic reaction to diphenhydramine  This medicine should not be given to women who are breast feeding  Do not give any over-the-counter (OTC) cough and cold medicine to a baby or child under 3years old   Using these medicines in very young children might cause serious or possibly life-threatening side effects  How to Use This Medicine:   Capsule, Thin Sheet, Dissolving Tablet, Tablet, Liquid, Liquid Filled Capsule, Chewable Tablet  · Your doctor will tell you how much medicine to use  Do not use more than directed  · Follow the instructions on the medicine label if you are using this medicine without a prescription  · Measure the oral liquid medicine with a marked measuring spoon, oral syringe, or medicine cup  · Swallow the capsule, tablet, and liquid filled capsule whole  Do not crush, break, or chew it  · The chewable tablet must be chewed completely before you swallow it  · Make sure your hands are dry before you handle the disintegrating tablet  Peel back the foil from the blister pack, then remove the tablet  Do not push the tablet through the foil  Place the tablet in your mouth  After it has melted, swallow or take a drink of water  If a dose is missed:   · Take a dose as soon as you remember  If it is almost time for your next dose, wait until then and take a regular dose  Do not take extra medicine to make up for a missed dose  How to Store and Dispose of This Medicine:   · Store the medicine in a closed container at room temperature, away from heat, moisture, and direct light  Do not freeze the liquid  · Ask your pharmacist, doctor, or health caregiver about the best way to dispose of any outdated medicine or medicine no longer needed  · Keep all medicine out of the reach of children  Never share your medicine with anyone  Drugs and Foods to Avoid:   Ask your doctor or pharmacist before using any other medicine, including over-the-counter medicines, vitamins, and herbal products  · Make sure your doctor knows if you are using an MAO inhibitor (MAOI) such as Eldepryl®, Marplan®, [place], or [place]arnate®  · Ask your doctor before you use any other products that have diphenhydramine in it  This includes medicines that you use on your skin    · Tell your doctor if you use anything else that makes you sleepy  Some examples are allergy medicine, narcotic pain medicine, and alcohol  · Do not drink alcohol while you are using this medicine  Warnings While Using This Medicine:   · Make sure your doctor knows if you are pregnant or breastfeeding, or if you have an enlarged prostate, or trouble urinating  Tell your doctor if you have glaucoma, or a breathing problem such as emphysema, bronchitis, or asthma  Make sure your doctor knows if you have any other allergies  · This medicine may make you dizzy or drowsy  Avoid driving, using machines, or doing anything else that could be dangerous if you are not alert  · This medicine might contain phenylalanine (aspartame)  This is a concern if you have a disorder called phenylketonuria (a problem with amino acids)  If you have this condition, talk to your doctor before using this medicine  Possible Side Effects While Using This Medicine:   Call your doctor right away if you notice any of these side effects:  · Allergic reaction: Itching or hives, swelling in your face or hands, swelling or tingling in your mouth or throat, chest tightness, trouble breathing  · Hallucinations (seeing things that are not really there)  · Lightheadedness or fainting  · Pain when urinating, or change in how much or how often you urinate  If you notice these less serious side effects, talk with your doctor:   · Clumsiness  · Constipation, nausea, or stomach upset  · Dry nose, mouth, or throat  · Nervousness or excitability, especially in children  · Upset stomach  · Thick mucus in your nose or throat  If you notice other side effects that you think are caused by this medicine, tell your doctor  Call your doctor for medical advice about side effects   You may report side effects to FDA at 5-814-FDA-8408  © 2017 [address] Information is for End User's use only and may not be sold, redistributed or otherwise used for commercial purposes  The above information is an  only  It is not intended as medical advice for individual conditions or treatments  Talk to your doctor, nurse or pharmacist before following any medical regimen to see if it is safe and effective for you

## 2018-02-22 NOTE — ED NOTES
Patient states her eyes are not as swollen as they were upon arrival from ER  Patient awaiting re-evaluation by ER physicians         Clifton Herron RN  02/22/18 0993

## 2018-02-22 NOTE — ED NOTES
Assumed care of patient at this time  Patient previously medicated by everardo Rhoades RN  02/22/18 [id number]

## 2018-02-27 NOTE — ED PROVIDER NOTES
History is obtained from patient  History  Chief Complaint   Patient presents with    Allergic Reaction     eyes swollen shut  Seen tuesday  at [facility]  and treated for allergic reaction with one lip swollen and some hives  Swollen eye lips  Hands swelling and diffuse hives  Is taking benedry and prednisone     HPI this is a 69-year-old woman who presents to the emergency department with eye swelling  The patient states that for the last several days, she has intermittent rash and eye swelling  She was seen in the emergency department for this and given steroids and antihistamines  She last took Benadryl yesterday, and is still taking prednisone  The patient states that she developed bilateral eye swelling and rash which was worse today  The patient does not have difficulty breathing  She denies oral swelling  She does not have nausea, vomiting, diarrhea, or lightheadedness  The patient was not prescribed an EpiPen  She has no history of anaphylaxis  She has a known latex allergy  The patient reports that she has had swollen eyes and rash which seems to occur when she goes to work  On the days that she does not work, it does not happen  The patient does not know of any exposures that she has had at work, 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  She does not have known food allergies  She has never seen an allergist     None       Past Medical History:   Diagnosis Date    Migraine        Past Surgical History:   Procedure Laterality Date    CHOLECYSTECTOMY         History reviewed  No pertinent family history  I have reviewed and agree with the history as documented  Social History   Substance Use Topics    Smoking status: Never Smoker    Smokeless tobacco: Never Used    Alcohol use No        Review of Systems the patient denies fevers, chills, shortness of breath, recent intercurrent illness, her review of systems otherwise negative in 12 systems were reviewed      Physical Exam  ED Triage Vitals Temperature Pulse Respirations Blood Pressure SpO2   02/22/18 0925 02/22/18 0925 02/22/18 0925 02/22/18 0925 02/22/18 0925   97 9 °F (36 6 °C) 85 18 142/94 97 %      Temp Source Heart Rate Source Patient Position - Orthostatic VS BP Location FiO2 (%)   02/22/18 0925 02/22/18 0925 02/22/18 0925 02/22/18 0925 --   Oral Monitor Sitting Left arm       Pain Score       02/22/18 1156       No Pain           Orthostatic Vital Signs  Vitals:    02/22/18 0925 02/22/18 1156 02/22/18 1200 02/22/18 1230   BP: 142/94 103/58 98/59 124/75   Pulse: 85 80 78 64   Patient Position - Orthostatic VS: Sitting Lying  Sitting       Physical Exam  On exam Vital signs were reviewed  Patient is awake, alert, interactive  The patient's pupils are equally round reactive to light  Oropharynx is clear with moist mucous membranes  There is no stridor  Neck is supple and nontender with no adenopathy or JVD  Heart is regular with no murmurs, rubs, or gallops  Lungs are clear and equal with no wheezes, rales, or rhonchi  Abdomen is soft and nontender with no masses, rebound, or guarding  There is no CVA tenderness  The patient was completely exposed  There is no skin breakdown  The patient has an urticarial rash that is dispersed, non confluent  She has bilateral periorbital swelling, but is able to open her eyes  She is nontoxic-appearing  Extremities are warm and well perfused with good pulses  The patient has normal strength, sensation, and cranial nerves  ED Medications  Medications   diphenhydrAMINE (BENADRYL) tablet 50 mg (50 mg Oral Given 2/22/18 1136)   famotidine (PEPCID) tablet 20 mg (20 mg Oral Given 2/22/18 1136)   predniSONE tablet 60 mg (60 mg Oral Given 2/22/18 1137)       Diagnostic Studies  Results Reviewed     None                 No orders to display              Procedures  Procedures       Phone Contacts  ED Phone Contact    ED Course  ED Course                 impression:  Allergic reaction    Patient likely has work related exposure  Patient has not been taking antihistamines  Will plan to treat her and reassess, will refer to allergist and will also prescribe EpiPen  reassessment:  Patient feeling better  Will discharge home as planned          [facility]  CritCare Time    Disposition  Final diagnoses: Allergic reaction, initial encounter     Time reflects when diagnosis was documented in both MDM as applicable and the Disposition within this note     Time User Action Codes Description Comment    2/22/2018 12:25 PM Lis Freeman Add [[id number]] Allergic reaction, initial encounter       ED Disposition     ED Disposition Condition Comment    Discharge  Raphael Lott discharge to home/self care  Condition at discharge: Good        Follow-up Information     Follow up With Specialties Details Why Aaliyah Kwong  Call in 2 days  you should call in falling to arrange to be seen by an allergist   Marcey Canavan should keep an allergy diary [phone]          Discharge Medication List as of 2/22/2018 12:37 PM      START taking these medications    Details   diphenhydrAMINE (BENADRYL) 25 mg capsule Take 2 capsules (50 mg total) by mouth every 8 (eight) hours as needed for itching, Starting Thu 2/22/2018, Normal      diphenhydrAMINE (BENADRYL) 50 MG tablet Take 1 tablet (50 mg total) by mouth once for 1 dose, Starting Thu 2/22/2018, Normal      EPINEPHrine (EPIPEN) 0 3 mg/0 3 mL SOAJ Inject 0 3 mL (0 3 mg total) into the shoulder, thigh, or buttocks once for 1 dose, Starting Thu 2/22/2018, Normal      famotidine (PEPCID) 20 mg tablet Take 1 tablet (20 mg total) by mouth 2 (two) times a day, Starting Thu 2/22/2018, Normal         CONTINUE these medications which have NOT CHANGED    Details   LORazepam (ATIVAN) 1 mg tablet Take 1 tablet by mouth every 8 (eight) hours as needed for anxiety for up to 2 days, Starting Mon 11/6/2017, Until Wed 11/8/2017, Print           No discharge procedures on file      ED Provider  Electronically Signed by           Dariel Lobo MD  02/26/18 Sim Mckeon MD  02/26/18 [id number]

## 2018-05-23 ENCOUNTER — HOSPITAL ENCOUNTER (EMERGENCY)
Facility: HOSPITAL | Age: 28
Discharge: HOME | End: 2018-05-23
Attending: EMERGENCY MEDICINE | Admitting: EMERGENCY MEDICINE
Payer: MEDICARE

## 2018-05-23 ENCOUNTER — APPOINTMENT (EMERGENCY)
Dept: RADIOLOGY | Facility: HOSPITAL | Age: 28
End: 2018-05-23
Payer: MEDICARE

## 2018-05-23 VITALS
OXYGEN SATURATION: 100 % | RESPIRATION RATE: 16 BRPM | DIASTOLIC BLOOD PRESSURE: 102 MMHG | WEIGHT: 210 LBS | TEMPERATURE: 98.5 F | BODY MASS INDEX: 36.05 KG/M2 | SYSTOLIC BLOOD PRESSURE: 166 MMHG | HEART RATE: 95 BPM

## 2018-05-23 DIAGNOSIS — G43.909 MIGRAINE: Primary | ICD-10-CM

## 2018-05-23 LAB
ALBUMIN SERPL BCP-MCNC: 3.6 G/DL (ref 3.5–5)
ALP SERPL-CCNC: 107 U/L (ref 46–116)
ALT SERPL W P-5'-P-CCNC: 56 U/L (ref 12–78)
ANION GAP BLD CALC-SCNC: 15 MMOL/L (ref 4–13)
ANION GAP SERPL CALCULATED.3IONS-SCNC: 5 MMOL/L (ref 4–13)
AST SERPL W P-5'-P-CCNC: 26 U/L (ref 5–45)
BASOPHILS # BLD AUTO: 0.04 THOUSANDS/ΜL (ref 0–0.1)
BASOPHILS NFR BLD AUTO: 0 % (ref 0–1)
BILIRUB SERPL-MCNC: 0.36 MG/DL (ref 0.2–1)
BUN BLD-MCNC: 15 MG/DL (ref 5–25)
BUN SERPL-MCNC: 16 MG/DL (ref 5–25)
CA-I BLD-SCNC: 1.16 MMOL/L (ref 1.12–1.32)
CALCIUM SERPL-MCNC: 8.7 MG/DL (ref 8.3–10.1)
CHLORIDE BLD-SCNC: 102 MMOL/L (ref 100–108)
CHLORIDE SERPL-SCNC: 104 MMOL/L (ref 100–108)
CO2 SERPL-SCNC: 29 MMOL/L (ref 21–32)
CREAT BLD-MCNC: 0.8 MG/DL (ref 0.6–1.3)
CREAT SERPL-MCNC: 0.85 MG/DL (ref 0.6–1.3)
EOSINOPHIL # BLD AUTO: 0.08 THOUSAND/ΜL (ref 0–0.61)
EOSINOPHIL NFR BLD AUTO: 1 % (ref 0–6)
ERYTHROCYTE [DISTWIDTH] IN BLOOD BY AUTOMATED COUNT: 13.4 % (ref 11.6–15.1)
EXT PREG TEST URINE: NEGATIVE
GFR SERPL CREATININE-BSD FRML MDRD: 102 ML/MIN/1.73SQ M
GFR SERPL CREATININE-BSD FRML MDRD: 95 ML/MIN/1.73SQ M
GLUCOSE SERPL-MCNC: 95 MG/DL (ref 65–140)
GLUCOSE SERPL-MCNC: 95 MG/DL (ref 65–140)
HCT VFR BLD AUTO: 37.8 % (ref 34.8–46.1)
HCT VFR BLD CALC: 38 % (ref 34.8–46.1)
HGB BLD-MCNC: 12.7 G/DL (ref 11.5–15.4)
HGB BLDA-MCNC: 12.9 G/DL (ref 11.5–15.4)
LYMPHOCYTES # BLD AUTO: 3.02 THOUSANDS/ΜL (ref 0.6–4.47)
LYMPHOCYTES NFR BLD AUTO: 30 % (ref 14–44)
MCH RBC QN AUTO: 29.6 PG (ref 26.8–34.3)
MCHC RBC AUTO-ENTMCNC: 33.6 G/DL (ref 31.4–37.4)
MCV RBC AUTO: 88 FL (ref 82–98)
MONOCYTES # BLD AUTO: 1.08 THOUSAND/ΜL (ref 0.17–1.22)
MONOCYTES NFR BLD AUTO: 11 % (ref 4–12)
NEUTROPHILS # BLD AUTO: 5.79 THOUSANDS/ΜL (ref 1.85–7.62)
NEUTS SEG NFR BLD AUTO: 58 % (ref 43–75)
NRBC BLD AUTO-RTO: 0 /100 WBCS
PCO2 BLD: 28 MMOL/L (ref 21–32)
PLATELET # BLD AUTO: 294 THOUSANDS/UL (ref 149–390)
PMV BLD AUTO: 10.8 FL (ref 8.9–12.7)
POTASSIUM BLD-SCNC: 3.6 MMOL/L (ref 3.5–5.3)
POTASSIUM SERPL-SCNC: 3.6 MMOL/L (ref 3.5–5.3)
PROT SERPL-MCNC: 7.9 G/DL (ref 6.4–8.2)
RBC # BLD AUTO: 4.29 MILLION/UL (ref 3.81–5.12)
SODIUM BLD-SCNC: 140 MMOL/L (ref 136–145)
SODIUM SERPL-SCNC: 138 MMOL/L (ref 136–145)
SPECIMEN SOURCE: ABNORMAL
WBC # BLD AUTO: 10.02 THOUSAND/UL (ref 4.31–10.16)

## 2018-05-23 PROCEDURE — 96374 THER/PROPH/DIAG INJ IV PUSH: CPT

## 2018-05-23 PROCEDURE — 70496 CT ANGIOGRAPHY HEAD: CPT

## 2018-05-23 PROCEDURE — 81025 URINE PREGNANCY TEST: CPT | Performed by: EMERGENCY MEDICINE

## 2018-05-23 PROCEDURE — 70498 CT ANGIOGRAPHY NECK: CPT

## 2018-05-23 PROCEDURE — 80053 COMPREHEN METABOLIC PANEL: CPT | Performed by: EMERGENCY MEDICINE

## 2018-05-23 PROCEDURE — 80047 BASIC METABLC PNL IONIZED CA: CPT

## 2018-05-23 PROCEDURE — 85025 COMPLETE CBC W/AUTO DIFF WBC: CPT | Performed by: EMERGENCY MEDICINE

## 2018-05-23 PROCEDURE — 85014 HEMATOCRIT: CPT

## 2018-05-23 PROCEDURE — 99284 EMERGENCY DEPT VISIT MOD MDM: CPT

## 2018-05-23 PROCEDURE — 36415 COLL VENOUS BLD VENIPUNCTURE: CPT | Performed by: EMERGENCY MEDICINE

## 2018-05-23 RX ORDER — ACETAMINOPHEN 325 MG/1
650 TABLET ORAL EVERY 6 HOURS PRN
COMMUNITY

## 2018-05-23 RX ORDER — ACETAMINOPHEN 500 MG
500 TABLET ORAL EVERY 6 HOURS PRN
Qty: 30 TABLET | Refills: 0 | Status: SHIPPED | OUTPATIENT
Start: 2018-05-23

## 2018-05-23 RX ORDER — IBUPROFEN 600 MG/1
TABLET ORAL EVERY 6 HOURS PRN
COMMUNITY

## 2018-05-23 RX ORDER — IBUPROFEN 400 MG/1
400 TABLET ORAL EVERY 6 HOURS PRN
Qty: 30 TABLET | Refills: 0 | Status: SHIPPED | OUTPATIENT
Start: 2018-05-23

## 2018-05-23 RX ORDER — LORAZEPAM 0.5 MG/1
2 TABLET ORAL ONCE
Status: COMPLETED | OUTPATIENT
Start: 2018-05-23 | End: 2018-05-23

## 2018-05-23 RX ORDER — ACETAMINOPHEN, ASPIRIN AND CAFFEINE 250; 250; 65 MG/1; MG/1; MG/1
1 TABLET, FILM COATED ORAL EVERY 6 HOURS PRN
COMMUNITY

## 2018-05-23 RX ORDER — METOCLOPRAMIDE HYDROCHLORIDE 5 MG/ML
10 INJECTION INTRAMUSCULAR; INTRAVENOUS ONCE
Status: COMPLETED | OUTPATIENT
Start: 2018-05-23 | End: 2018-05-23

## 2018-05-23 RX ADMIN — IOHEXOL 85 ML: 350 INJECTION, SOLUTION INTRAVENOUS at 01:51

## 2018-05-23 RX ADMIN — METOCLOPRAMIDE 10 MG: 5 INJECTION, SOLUTION INTRAMUSCULAR; INTRAVENOUS at 00:49

## 2018-05-23 RX ADMIN — SODIUM CHLORIDE 1000 ML: 0.9 INJECTION, SOLUTION INTRAVENOUS at 00:49

## 2018-05-23 RX ADMIN — LORAZEPAM 2 MG: 0.5 TABLET ORAL at 01:36

## 2018-05-23 NOTE — ED PROVIDER NOTES
History  Chief Complaint   Patient presents with    Migraine     pt reports migraine starting this morning that is worse than she has had before  reports nausea and right eye pain     HPI   34 yo F who presents for eval of gradual onset of headache over the past month  Pt  says she has been having intermittent headches over the last month  The headache got acutely worse this morning  Pt  says the headache woke her up from sleep  Pts headaches are typically posterior and occipital  Todays headache is bl/frontal   Pt  has pain behind her R eye  Pt says headache is 7/10  Pt  took ibuprofen with some relief  Pt  denies f/ch/n/v/d/neck stiffness  No rashes  No sob  Pt  has FH cerebral aneurysms in a parent/sibling  12 systems reviewed otherwise negative except as stated in hpi  Imp/plan: 34 yo F pw acute on chronic ha x1 month  Got wrose today  Has FH cerebral aneurysms  We will treat symptomatically, obtain CTA head/neck w/w/o contrast, check basic labs, do funduscopic exam, reassess  Prior to Admission Medications   Prescriptions Last Dose Informant Patient Reported? Taking?   acetaminophen (TYLENOL) 325 mg tablet   Yes Yes   Sig: Take 650 mg by mouth every 6 (six) hours as needed for mild pain   aspirin-acetaminophen-caffeine (EXCEDRIN MIGRAINE) 250-250-65 MG per tablet   Yes Yes   Sig: Take 1 tablet by mouth every 6 (six) hours as needed for headaches   ibuprofen (MOTRIN) 600 mg tablet   Yes Yes   Sig: Take by mouth every 6 (six) hours as needed for mild pain      Facility-Administered Medications: None       Past Medical History:   Diagnosis Date    Migraine        Past Surgical History:   Procedure Laterality Date    CHOLECYSTECTOMY         History reviewed  No pertinent family history  I have reviewed and agree with the history as documented      Social History   Substance Use Topics    Smoking status: Never Smoker    Smokeless tobacco: Never Used    Alcohol use No        Review of Systems Constitutional: Negative for activity change, appetite change, chills and fever  HENT: Negative for sore throat, trouble swallowing and voice change  Eyes: Negative for photophobia, pain, discharge, redness, itching and visual disturbance  Respiratory: Negative for cough and shortness of breath  Cardiovascular: Negative for chest pain, palpitations and leg swelling  Gastrointestinal: Negative for abdominal pain, diarrhea, nausea and vomiting  Endocrine: Negative for polyphagia and polyuria  Genitourinary: Negative for dysuria, vaginal discharge and vaginal pain  Musculoskeletal: Negative for arthralgias, back pain, gait problem, joint swelling, myalgias, neck pain and neck stiffness  Skin: Negative for rash and wound  Allergic/Immunologic: Negative  Neurological: Positive for headaches  Negative for dizziness, tremors, seizures, syncope, facial asymmetry, speech difficulty, weakness, light-headedness and numbness  Hematological: Negative for adenopathy  Does not bruise/bleed easily  Psychiatric/Behavioral: Negative for agitation and behavioral problems  Physical Exam  ED Triage Vitals   Temperature Pulse Respirations Blood Pressure SpO2   05/23/18 0018 05/23/18 0018 05/23/18 0018 05/23/18 0018 05/23/18 0018   98 5 °F (36 9 °C) 95 16 (!) 166/102 100 %      Temp Source Heart Rate Source Patient Position - Orthostatic VS BP Location FiO2 (%)   05/23/18 0018 -- -- -- --   Tympanic          Pain Score       05/23/18 0058       8           Orthostatic Vital Signs  Vitals:    05/23/18 0018   BP: (!) 166/102   Pulse: 95       Physical Exam   Constitutional: She is oriented to person, place, and time  She appears well-developed and well-nourished  No distress  HENT:   Head: Normocephalic and atraumatic  Right Ear: No hemotympanum  Left Ear: No hemotympanum  Nose: Nose normal  No nasal septal hematoma     Mouth/Throat: Uvula is midline, oropharynx is clear and moist and mucous membranes are normal  She does not have dentures  No oropharyngeal exudate  Eyes: Conjunctivae and EOM are normal  Pupils are equal, round, and reactive to light  Right eye exhibits no discharge  Left eye exhibits no discharge  No scleral icterus  Right eye exhibits no nystagmus  Left eye exhibits no nystagmus  july 3 to 2 mm bl  Normal eom  No nystagmus  Normal fundoscopic exam  Normal optic disc margins  Normal visual fields  Normal visual acuity   Neck: Trachea normal, normal range of motion, full passive range of motion without pain and phonation normal  Neck supple  No JVD present  No tracheal tenderness present  No tracheal deviation present  No thyromegaly present  No c-spine tenderness   Cardiovascular: Normal rate, regular rhythm, normal heart sounds and intact distal pulses  Exam reveals no gallop and no friction rub  No murmur heard  Pulmonary/Chest: Effort normal and breath sounds normal  No stridor  No respiratory distress  She has no wheezes  She has no rales  She exhibits no tenderness  Abdominal: Soft  Bowel sounds are normal  She exhibits no distension and no mass  There is no tenderness  There is no rebound and no guarding  No hernia  Musculoskeletal: Normal range of motion  She exhibits no edema, tenderness or deformity  Lymphadenopathy:     She has no cervical adenopathy  Neurological: She is alert and oriented to person, place, and time  She has normal strength  No cranial nerve deficit or sensory deficit  GCS eye subscore is 4  GCS verbal subscore is 5  GCS motor subscore is 6  Reflex Scores:       Patellar reflexes are 2+ on the right side and 2+ on the left side  Achilles reflexes are 2+ on the right side and 2+ on the left side  Cn 2-12 intact  Normal strength  Normal speech  Normal heel to shin, finger to nose  Normal gait  No drift   Skin: Skin is warm and dry  Capillary refill takes less than 2 seconds  No rash noted  She is not diaphoretic  No erythema   No pallor  Psychiatric: She has a normal mood and affect  Nursing note and vitals reviewed  ED Medications  Medications   sodium chloride 0 9 % bolus 1,000 mL (0 mL Intravenous Stopped 5/23/18 0110)   metoclopramide (REGLAN) injection 10 mg (10 mg Intravenous Given 5/23/18 0049)   LORazepam (ATIVAN) tablet 2 mg (2 mg Oral Given 5/23/18 0136)   iohexol (OMNIPAQUE) 350 MG/ML injection (MULTI-DOSE) 85 mL (85 mL Intravenous Given 5/23/18 0151)       Diagnostic Studies  Results Reviewed     Procedure Component Value Units Date/Time    Comprehensive metabolic panel [[id number]] Collected:  05/23/18 0049    Lab Status:  Final result Specimen:  Blood from Arm, Right Updated:  05/23/18 0118     Sodium 138 mmol/L      Potassium 3 6 mmol/L      Chloride 104 mmol/L      CO2 29 mmol/L      Anion Gap 5 mmol/L      BUN 16 mg/dL      Creatinine 0 85 mg/dL      Glucose 95 mg/dL      Calcium 8 7 mg/dL      AST 26 U/L      ALT 56 U/L      Alkaline Phosphatase 107 U/L      Total Protein 7 9 g/dL      Albumin 3 6 g/dL      Total Bilirubin 0 36 mg/dL      eGFR 95 ml/min/1 73sq m     Narrative:         National Kidney Disease Education Program recommendations are as follows:  GFR calculation is accurate only with a steady state creatinine  Chronic Kidney disease less than 60 ml/min/1 73 sq  meters  Kidney failure less than 15 ml/min/1 73 sq  meters      CBC and differential [[id number]] Collected:  05/23/18 0049    Lab Status:  Final result Specimen:  Blood from Arm, Right Updated:  05/23/18 0108     WBC 10 02 Thousand/uL      RBC 4 29 Million/uL      Hemoglobin 12 7 g/dL      Hematocrit 37 8 %      MCV 88 fL      MCH 29 6 pg      MCHC 33 6 g/dL      RDW 13 4 %      MPV 10 8 fL      Platelets 043 Thousands/uL      nRBC 0 /100 WBCs      Neutrophils Relative 58 %      Lymphocytes Relative 30 %      Monocytes Relative 11 %      Eosinophils Relative 1 %      Basophils Relative 0 %      Neutrophils Absolute 5 79 Thousands/µL      Lymphocytes Absolute 3 02 Thousands/µL      Monocytes Absolute 1 08 Thousand/µL      Eosinophils Absolute 0 08 Thousand/µL      Basophils Absolute 0 04 Thousands/µL     POCT Chem 8+ [[id number]]  (Abnormal) Collected:  05/23/18 0058    Lab Status:  Final result Updated:  05/23/18 0102     SODIUM, I-STAT 140 mmol/l      Potassium, i-STAT 3 6 mmol/L      Chloride, istat 102 mmol/L      CO2, i-STAT 28 mmol/L      Anion Gap, Istat 15 (H) mmol/L      Calcium, Ionized i-STAT 1 16 mmol/L      BUN, I-STAT 15 mg/dl      Creatinine, i-STAT 0 8 mg/dl      eGFR 102 ml/min/1 73sq m      Glucose, i-STAT 95 mg/dl      Hct, i-STAT 38 %      Hgb, i-STAT 12 9 g/dl      Specimen Type VENOUS    POCT pregnancy, urine [[id number]]  (Normal) Resulted:  05/23/18 0057    Lab Status:  Final result Updated:  05/23/18 0057     EXT PREG TEST UR (Ref: Negative) Negative                 CTA head and neck with and without contrast   Final Result by Evin Oden MD (05/23 0215)      1  No acute intracranial abnormality  The orbits are unremarkable  2   No hemodynamically significant stenosis within the arteries of the neck  3   No high-grade proximal stenosis to visualized [place]  4   No arteriovenous malformation or aneurysm  Workstation performed: [id number]               Procedures  Procedures      Phone Consults  ED Phone Contact    ED Course  ED Course as of May 29 1520   Wed May 23, 2018   0158 Pt says HA improved  Currently 4/10    0220 Pt  Says HA greatly improved  Normal cta  We will dc with f/u to pcp  Strict return precautions discussed                                   MDM  CritCare Time    Disposition  Final diagnoses:   Migraine     Time reflects when diagnosis was documented in both MDM as applicable and the Disposition within this note     Time User Action Codes Description Comment    5/23/2018  2:21 AM Mariela GARRIDO Add [N96 [id number]] Migraine       ED Disposition     ED Disposition Condition Comment    Discharge  Archana Proctor Yuliya No discharge to home/self care  Condition at discharge: Good        Follow-up Information     Follow up With Specialties Details Why Contact Info Additional Olayinka Francois  In 2 days  [address] [facility] [address] [facility] [address] [facility] Emergency Department Emergency Medicine  If symptoms worsen [address]  [phone]  ED, [address], [address] [facility], [address]          Discharge Medication List as of 5/23/2018  2:23 AM      START taking these medications    Details   !! acetaminophen (TYLENOL) 500 mg tablet Take 1 tablet (500 mg total) by mouth every 6 (six) hours as needed for mild pain, Starting Wed 5/23/2018, Print      !! ibuprofen (MOTRIN) 400 mg tablet Take 1 tablet (400 mg total) by mouth every 6 (six) hours as needed for mild pain, Starting Wed 5/23/2018, Print       !! - Potential duplicate medications found  Please discuss with provider  CONTINUE these medications which have NOT CHANGED    Details   !! acetaminophen (TYLENOL) 325 mg tablet Take 650 mg by mouth every 6 (six) hours as needed for mild pain, Historical Med      aspirin-acetaminophen-caffeine (EXCEDRIN MIGRAINE) 250-250-65 MG per tablet Take 1 tablet by mouth every 6 (six) hours as needed for headaches, Historical Med      !! ibuprofen (MOTRIN) 600 mg tablet Take by mouth every 6 (six) hours as needed for mild pain, Historical Med       !! - Potential duplicate medications found  Please discuss with provider  No discharge procedures on file  ED Provider  Attending physically available and evaluated Giovanni Taylor  LLOYD managed the patient along with the ED Attending      Electronically Signed by         Rancho Dey MD  05/29/18 0163

## 2018-05-23 NOTE — ED NOTES
Pt ringing bell, upon entering pt pacing around room, crying, and appears very anxious  Pt states she just wants to leave  Dr Shirley hester and at the bedside to speak with pt  Pt still wishes to leave AMA at this time       Jillian Chen RN  05/23/18 0116

## 2018-05-23 NOTE — ED NOTES
Patient forgot necklace in room  Attempted to call listed phone number at [phone] without success  Necklace labeled and placed in container for safe keeping       Nena Borges RN  05/23/18 1733

## 2018-05-23 NOTE — DISCHARGE INSTRUCTIONS
Migraine Headache   WHAT YOU SHOULD KNOW:   A migraine is a severe headache  The pain can be so severe that it interferes with your daily activities  A migraine can last a few hours up to several days  The exact cause of migraines is not known  It may be caused by changes in your body chemicals and extra sensitive nerves in your brain  AFTER YOU LEAVE:   Medicines:  Take medicine as soon as you feel a migraine begin  · Pain medicine: You may need medicine to take away or decrease pain  You may need a doctor's order for this medicine  Do not wait until the pain is severe before you take your medicine  · Migraine medicines: These are used to help prevent a migraine or stop it once it starts  · Antinausea medicine: This medicine may be given to calm your stomach and to help prevent vomiting  They can also help relieve pain  · Take your medicine as directed  Call your healthcare provider if you think your medicine is not helping or if you have side effects  Tell him if you are allergic to any medicine  Keep a list of the medicines, vitamins, and herbs you take  Include the amounts, and when and why you take them  Bring the list or the pill bottles to follow-up visits  Carry your medicine list with you in case of an emergency  Manage your symptoms:   · Rest:  Rest in a dark, quiet room  This will help decrease your pain  · Ice:  Ice helps decrease pain  Use an ice pack or put crushed ice in a plastic bag  Cover the ice pack with a towel and place it on your head where it hurts for 15 to 20 minutes every hour  · Heat:  Heat helps decrease pain and muscle spasms  Use a small towel dampened with warm water or a heating pad, or sit in a warm bath  Apply heat on the area for 20 to 30 minutes every 2 hours  You may alternate heat and ice  Keep a headache diary:  Write down when your migraines start and stop  Include your symptoms and what you were doing when a migraine began   Record what you ate or drank for 24 hours before the migraine started  Describe the pain and where it hurts  Keep track of what you did to treat your migraine and whether it worked  Follow up with your primary healthcare provider or neurologist as directed:  Bring your headache diary with you when you see your primary healthcare provider  Write down your questions so you remember to ask them during your visits  Prevent another migraine:   · Do not smoke: If you smoke, it is never too late to quit  Tobacco smoke can trigger a migraine  It can also cause heart disease, lung disease, cancer, and other health problems  Quitting smoking will improve your health and the health of those around you  If you smoke, ask for information about how to stop  · Do not drink alcohol:  Alcohol can trigger a migraine  It can also interfere with the medicines used to treat your migraine  · Get regular exercise:  Exercise may help prevent migraines  Talk to your primary healthcare provider about the best exercise plan for you  · Manage stress:  Stress may trigger a migraine  Learn new ways to relax, such as deep breathing  · Stick to a sleep schedule:  Go to bed and get up at the same time each day  · Eat regular meals:  Include healthy foods such as include fruit, vegetables, whole-grain breads, low-fat dairy products, beans, lean meat, and fish  Avoid trigger foods like chocolate, hard cheese, and red wine  Foods that contain gluten, nitrates, MSG, or artificial sweeteners may also trigger migraines  Caffeine, which is often used to treat migraines, can also trigger them  Contact your primary healthcare provider or neurologist if:   · You have a fever  · Your migraines interfere with your daily activities  · Your medicines or treatments stop working  · You have questions about your condition or care    Seek care immediately or call 911 if:   · You have a headache that seems different or much worse than your usual migraine headache  · You have a severe headache with a fever or a stiff neck  · You have new problems with speech, vision, balance, or movement  · You feel like you are going to faint, you become confused, or you have a seizure  © 2014 [address] is for End User's use only and may not be sold, redistributed or otherwise used for commercial purposes  All illustrations and images included in CareNotes® are the copyrighted property of [employer]  or Josse Medina  The above information is an  only  It is not intended as medical advice for individual conditions or treatments  Talk to your doctor, nurse or pharmacist before following any medical regimen to see if it is safe and effective for you

## 2019-05-17 ENCOUNTER — HOSPITAL ENCOUNTER (EMERGENCY)
Facility: HOSPITAL | Age: 29
Discharge: HOME | End: 2019-05-17
Attending: EMERGENCY MEDICINE | Admitting: EMERGENCY MEDICINE
Payer: MEDICARE

## 2019-05-17 VITALS
DIASTOLIC BLOOD PRESSURE: 103 MMHG | BODY MASS INDEX: 38.52 KG/M2 | RESPIRATION RATE: 18 BRPM | HEART RATE: 78 BPM | WEIGHT: 224.43 LBS | SYSTOLIC BLOOD PRESSURE: 147 MMHG | OXYGEN SATURATION: 100 % | TEMPERATURE: 98.1 F

## 2019-05-17 DIAGNOSIS — M79.661 RIGHT CALF PAIN: Primary | ICD-10-CM

## 2019-05-17 PROCEDURE — 99284 EMERGENCY DEPT VISIT MOD MDM: CPT | Performed by: EMERGENCY MEDICINE

## 2019-05-17 PROCEDURE — 99283 EMERGENCY DEPT VISIT LOW MDM: CPT

## 2019-05-17 RX ORDER — IBUPROFEN 600 MG/1
600 TABLET ORAL EVERY 6 HOURS PRN
Qty: 30 TABLET | Refills: 0 | Status: SHIPPED | OUTPATIENT
Start: 2019-05-17

## 2019-08-06 ENCOUNTER — HOSPITAL ENCOUNTER (EMERGENCY)
Facility: HOSPITAL | Age: 29
Discharge: HOME | End: 2019-08-07
Attending: EMERGENCY MEDICINE | Admitting: EMERGENCY MEDICINE
Payer: MEDICARE

## 2019-08-06 DIAGNOSIS — R10.2 SUPRAPUBIC ABDOMINAL PAIN: Primary | ICD-10-CM

## 2019-08-06 LAB
BASOPHILS # BLD AUTO: 0.05 THOUSANDS/ΜL (ref 0–0.1)
BASOPHILS NFR BLD AUTO: 0 % (ref 0–1)
BILIRUB UR QL STRIP: NEGATIVE
CLARITY UR: CLEAR
COLOR UR: YELLOW
COLOR, POC: NORMAL
EOSINOPHIL # BLD AUTO: 0.07 THOUSAND/ΜL (ref 0–0.61)
EOSINOPHIL NFR BLD AUTO: 1 % (ref 0–6)
ERYTHROCYTE [DISTWIDTH] IN BLOOD BY AUTOMATED COUNT: 12.3 % (ref 11.6–15.1)
EXT PREG TEST URINE: NEGATIVE
EXT. CONTROL ED NAV: NORMAL
GLUCOSE UR STRIP-MCNC: NEGATIVE MG/DL
HCT VFR BLD AUTO: 39.7 % (ref 34.8–46.1)
HGB BLD-MCNC: 12.6 G/DL (ref 11.5–15.4)
HGB UR QL STRIP.AUTO: ABNORMAL
IMM GRANULOCYTES # BLD AUTO: 0.05 THOUSAND/UL (ref 0–0.2)
IMM GRANULOCYTES NFR BLD AUTO: 0 % (ref 0–2)
KETONES UR STRIP-MCNC: NEGATIVE MG/DL
LEUKOCYTE ESTERASE UR QL STRIP: NEGATIVE
LYMPHOCYTES # BLD AUTO: 2.7 THOUSANDS/ΜL (ref 0.6–4.47)
LYMPHOCYTES NFR BLD AUTO: 24 % (ref 14–44)
MCH RBC QN AUTO: 29.1 PG (ref 26.8–34.3)
MCHC RBC AUTO-ENTMCNC: 31.7 G/DL (ref 31.4–37.4)
MCV RBC AUTO: 92 FL (ref 82–98)
MONOCYTES # BLD AUTO: 1.04 THOUSAND/ΜL (ref 0.17–1.22)
MONOCYTES NFR BLD AUTO: 9 % (ref 4–12)
NEUTROPHILS # BLD AUTO: 7.37 THOUSANDS/ΜL (ref 1.85–7.62)
NEUTS SEG NFR BLD AUTO: 66 % (ref 43–75)
NITRITE UR QL STRIP: NEGATIVE
NRBC BLD AUTO-RTO: 0 /100 WBCS
PH UR STRIP.AUTO: 6 [PH] (ref 4.5–8)
PLATELET # BLD AUTO: 307 THOUSANDS/UL (ref 149–390)
PMV BLD AUTO: 10.7 FL (ref 8.9–12.7)
PROT UR STRIP-MCNC: NEGATIVE MG/DL
RBC # BLD AUTO: 4.33 MILLION/UL (ref 3.81–5.12)
SP GR UR STRIP.AUTO: 1.01 (ref 1–1.03)
UROBILINOGEN UR QL STRIP.AUTO: 0.2 E.U./DL
WBC # BLD AUTO: 11.28 THOUSAND/UL (ref 4.31–10.16)

## 2019-08-06 PROCEDURE — 99284 EMERGENCY DEPT VISIT MOD MDM: CPT

## 2019-08-06 PROCEDURE — 81025 URINE PREGNANCY TEST: CPT | Performed by: EMERGENCY MEDICINE

## 2019-08-06 PROCEDURE — 36415 COLL VENOUS BLD VENIPUNCTURE: CPT | Performed by: EMERGENCY MEDICINE

## 2019-08-06 PROCEDURE — 80053 COMPREHEN METABOLIC PANEL: CPT | Performed by: EMERGENCY MEDICINE

## 2019-08-06 PROCEDURE — 96374 THER/PROPH/DIAG INJ IV PUSH: CPT

## 2019-08-06 PROCEDURE — 96375 TX/PRO/DX INJ NEW DRUG ADDON: CPT

## 2019-08-06 PROCEDURE — 83690 ASSAY OF LIPASE: CPT | Performed by: EMERGENCY MEDICINE

## 2019-08-06 PROCEDURE — 85025 COMPLETE CBC W/AUTO DIFF WBC: CPT | Performed by: EMERGENCY MEDICINE

## 2019-08-06 PROCEDURE — 81001 URINALYSIS AUTO W/SCOPE: CPT

## 2019-08-06 RX ORDER — ONDANSETRON 2 MG/ML
4 INJECTION INTRAMUSCULAR; INTRAVENOUS ONCE
Status: COMPLETED | OUTPATIENT
Start: 2019-08-06 | End: 2019-08-06

## 2019-08-06 RX ORDER — KETOROLAC TROMETHAMINE 30 MG/ML
15 INJECTION, SOLUTION INTRAMUSCULAR; INTRAVENOUS ONCE
Status: COMPLETED | OUTPATIENT
Start: 2019-08-07 | End: 2019-08-06

## 2019-08-06 RX ADMIN — KETOROLAC TROMETHAMINE 15 MG: 30 INJECTION, SOLUTION INTRAMUSCULAR at 23:50

## 2019-08-06 RX ADMIN — ONDANSETRON 4 MG: 2 INJECTION INTRAMUSCULAR; INTRAVENOUS at 23:47

## 2019-08-07 ENCOUNTER — APPOINTMENT (EMERGENCY)
Dept: RADIOLOGY | Facility: HOSPITAL | Age: 29
End: 2019-08-07
Payer: MEDICARE

## 2019-08-07 VITALS
HEIGHT: 64 IN | RESPIRATION RATE: 16 BRPM | HEART RATE: 74 BPM | OXYGEN SATURATION: 98 % | SYSTOLIC BLOOD PRESSURE: 120 MMHG | TEMPERATURE: 98 F | DIASTOLIC BLOOD PRESSURE: 69 MMHG | WEIGHT: 235 LBS | BODY MASS INDEX: 40.12 KG/M2

## 2019-08-07 LAB
ALBUMIN SERPL BCP-MCNC: 4 G/DL (ref 3.5–5)
ALP SERPL-CCNC: 97 U/L (ref 46–116)
ALT SERPL W P-5'-P-CCNC: 43 U/L (ref 12–78)
ANION GAP SERPL CALCULATED.3IONS-SCNC: 8 MMOL/L (ref 4–13)
AST SERPL W P-5'-P-CCNC: 17 U/L (ref 5–45)
BACTERIA UR QL AUTO: ABNORMAL /HPF
BILIRUB SERPL-MCNC: 0.37 MG/DL (ref 0.2–1)
BUN SERPL-MCNC: 13 MG/DL (ref 5–25)
CALCIUM SERPL-MCNC: 9.2 MG/DL (ref 8.3–10.1)
CHLORIDE SERPL-SCNC: 108 MMOL/L (ref 100–108)
CO2 SERPL-SCNC: 28 MMOL/L (ref 21–32)
CREAT SERPL-MCNC: 0.98 MG/DL (ref 0.6–1.3)
GFR SERPL CREATININE-BSD FRML MDRD: 79 ML/MIN/1.73SQ M
GLUCOSE SERPL-MCNC: 100 MG/DL (ref 65–140)
HYALINE CASTS #/AREA URNS LPF: ABNORMAL /LPF
LIPASE SERPL-CCNC: 95 U/L (ref 73–393)
NON-SQ EPI CELLS URNS QL MICRO: ABNORMAL /HPF
POTASSIUM SERPL-SCNC: 3.7 MMOL/L (ref 3.5–5.3)
PROT SERPL-MCNC: 8.7 G/DL (ref 6.4–8.2)
RBC #/AREA URNS AUTO: ABNORMAL /HPF
SODIUM SERPL-SCNC: 144 MMOL/L (ref 136–145)
WBC #/AREA URNS AUTO: ABNORMAL /HPF

## 2019-08-07 PROCEDURE — 87591 N.GONORRHOEAE DNA AMP PROB: CPT | Performed by: EMERGENCY MEDICINE

## 2019-08-07 PROCEDURE — 74177 CT ABD & PELVIS W/CONTRAST: CPT

## 2019-08-07 PROCEDURE — 99284 EMERGENCY DEPT VISIT MOD MDM: CPT | Performed by: EMERGENCY MEDICINE

## 2019-08-07 PROCEDURE — 87491 CHLMYD TRACH DNA AMP PROBE: CPT | Performed by: EMERGENCY MEDICINE

## 2019-08-07 RX ADMIN — IOHEXOL 100 ML: 350 INJECTION, SOLUTION INTRAVENOUS at 02:38

## 2019-08-07 NOTE — ED ATTENDING ATTESTATION
Piotr Vasquez MD, saw and evaluated the patient  All available labs and X-rays were ordered by me or the resident and have been reviewed by myself  I discussed the patient with the resident / non-physician and agree with the resident's / non-physician practitioner's findings and plan as documented in the resident's / non-physician practicitioner's note, except where noted  At this point, I agree with the current assessment done in the ED  I was present during key portions of all procedures performed unless otherwise stated  Chief Complaint   Patient presents with    Abdominal Pain     Per patient, has pain in lower abdominal radiating to flank on right side  Started a week ago, initally had vomiting and fever, that has subsided  Today increased pain, decreased urinary frequency  This is a 58-year-old female presenting for evaluation of suprapubic discomfort going towards her right flank  She states that for the past 1 week she has been having this pain, initially had 1 episode of nonbloody nonbilious emesis and subjective fever but has gone away  No fever per since then  No chest pain shortness of breath  No cough congestion rhinorrhea sore throat  She denies any burning itching pain or blood with urination to me  Denies any vaginal discharge lately but says that early July she had a little bit but has since resolved  She says the pain is intermittent and comes in paroxysms  No new sexual partners, but is sexually active with 1 partner does not use any condoms  She does not know she is pregnant  She has had similar pain about 2 years ago and says that was due to a kidney stone  PE:  Vitals:    08/06/19 2312 08/07/19 0249   BP: (!) 167/116 120/69   BP Location:  Right arm   Pulse: 88 74   Resp: 20 16   Temp: 98 °F (36 7 °C)    TempSrc: Oral    SpO2: 100% 98%   Weight: 107 kg (235 lb)    Height: 5' 4" (1 626 m)    General: VSS, NAD, awake, alert  Well-nourished, well-developed  Appears stated age  Speaking normally in full sentences  Head: Normocephalic, atraumatic, nontender  Eyes: PERRL, EOM-I  No diplopia  No hyphema  No subconjunctival hemorrhages  Symmetrical lids  ENT: Atraumatic external nose and ears  MMM  No malocclusion  No stridor  Normal phonation  No drooling  Normal swallowing  Neck: Symmetric, trachea midline  No JVD  CV: RRR  +S1/S2  No murmurs or gallops  Peripheral pulses +2 throughout  No chest wall tenderness  Lungs:   Unlabored No retractions  CTAB, lungs sounds equal bilateral    No tachypnea  Abd: +BS, soft, RLQ/suprapubpic discomfort  No rebound   No guarding  No heel strike  No psoas  ND  No rigidity  Mild RIGHT CVAT  Nothing on left  MSK:   FROM   Back:   No rashes  Skin: Dry, intact  Neuro: AAOx3, GCS 15, CN II-XII grossly intact  Motor grossly intact  Psychiatric/Behavioral: Appropriate mood and affect   Exam: deferred  A:  - R CVAT  - Suprapubic discomfort  - RLQ pain  P:  - CT scan for acute pathology  - Discussed pelvic with patient; deferred  - Will do urine testing for preg/infection  - Treat pain/nausea, re-evaluate  - Dispo per imaging    - 13 point ROS was performed and all are normal unless stated in the history above  - Nursing note reviewed  Vitals reviewed  - Orders placed by myself and/or advanced practitioner / resident     - Previous chart was reviewed  - No language barrier    - History obtained from patient  - There are no limitations to the history obtained  - Critical care time: Not applicable for this patient  Final Diagnosis:  1   Suprapubic abdominal pain        ED Course as of Aug 08 1555   Tue Aug 06, 2019   2354 Leukocytes, UA: Negative   2354 Nitrite, UA: Negative   2354 PREGNANCY TEST URINE: negative     Medications   ondansetron (ZOFRAN) injection 4 mg (4 mg Intravenous Given 8/6/19 2347)   ketorolac (TORADOL) injection 15 mg (15 mg Intravenous Given 8/6/19 2350)   iohexol (OMNIPAQUE) 350 MG/ML injection (MULTI-DOSE) 100 mL (100 mL Intravenous Given 8/7/19 0238)     CT abdomen pelvis with contrast   Final Result      Hepatic steatosis              Workstation performed: [id number]           Orders Placed This Encounter   Procedures    Chlamydia/GC amplified DNA by PCR    CT abdomen pelvis with contrast    CBC and differential    Comprehensive metabolic panel    Lipase    Urine Microscopic    POCT pregnancy, urine    POCT urinalysis dipstick     Labs Reviewed   CBC AND DIFFERENTIAL - Abnormal       Result Value Ref Range Status    WBC 11 28 (*) 4 31 - 10 16 Thousand/uL Final    RBC 4 33  3 81 - 5 12 Million/uL Final    Hemoglobin 12 6  11 5 - 15 4 g/dL Final    Hematocrit 39 7  34 8 - 46 1 % Final    MCV 92  82 - 98 fL Final    MCH 29 1  26 8 - 34 3 pg Final    MCHC 31 7  31 4 - 37 4 g/dL Final    RDW 12 3  11 6 - 15 1 % Final    MPV 10 7  8 9 - 12 7 fL Final    Platelets 192  863 - 390 Thousands/uL Final    nRBC 0  /100 WBCs Final    Neutrophils Relative 66  43 - 75 % Final    Immat GRANS % 0  0 - 2 % Final    Lymphocytes Relative 24  14 - 44 % Final    Monocytes Relative 9  4 - 12 % Final    Eosinophils Relative 1  0 - 6 % Final    Basophils Relative 0  0 - 1 % Final    Neutrophils Absolute 7 37  1 85 - 7 62 Thousands/µL Final    Immature Grans Absolute 0 05  0 00 - 0 20 Thousand/uL Final    Lymphocytes Absolute 2 70  0 60 - 4 47 Thousands/µL Final    Monocytes Absolute 1 04  0 17 - 1 22 Thousand/µL Final    Eosinophils Absolute 0 07  0 00 - 0 61 Thousand/µL Final    Basophils Absolute 0 05  0 00 - 0 10 Thousands/µL Final   COMPREHENSIVE METABOLIC PANEL - Abnormal    Sodium 144  136 - 145 mmol/L Final    Potassium 3 7  3 5 - 5 3 mmol/L Final    Chloride 108  100 - 108 mmol/L Final    CO2 28  21 - 32 mmol/L Final    ANION GAP 8  4 - 13 mmol/L Final    BUN 13  5 - 25 mg/dL Final    Creatinine 0 98  0 60 - 1 30 mg/dL Final    Comment: Standardized to IDMS reference method    Glucose 100  65 - 140 mg/dL Final    Comment:   If the patient is fasting, the ADA then defines impaired fasting glucose as > 100 mg/dL and diabetes as > or equal to 123 mg/dL  Specimen collection should occur prior to Sulfasalazine administration due to the potential for falsely depressed results  Specimen collection should occur prior to Sulfapyridine administration due to the potential for falsely elevated results  Calcium 9 2  8 3 - 10 1 mg/dL Final    AST 17  5 - 45 U/L Final    Comment:   Specimen collection should occur prior to Sulfasalazine administration due to the potential for falsely depressed results  ALT 43  12 - 78 U/L Final    Comment:   Specimen collection should occur prior to Sulfasalazine and/or Sulfapyridine administration due to the potential for falsely depressed results       Alkaline Phosphatase 97  46 - 116 U/L Final    Total Protein 8 7 (*) 6 4 - 8 2 g/dL Final    Albumin 4 0  3 5 - 5 0 g/dL Final    Total Bilirubin 0 37  0 20 - 1 00 mg/dL Final    eGFR 79  ml/min/1 73sq m Final    Narrative:     [place] guidelines for Chronic Kidney Disease (CKD):     Stage 1 with normal or high GFR (GFR > 90 mL/min/1 73 square meters)    Stage 2 Mild CKD (GFR = 60-89 mL/min/1 73 square meters)    Stage 3A Moderate CKD (GFR = 45-59 mL/min/1 73 square meters)    Stage 3B Moderate CKD (GFR = 30-44 mL/min/1 73 square meters)    Stage 4 Severe CKD (GFR = 15-29 mL/min/1 73 square meters)    Stage 5 End Stage CKD (GFR <15 mL/min/1 73 square meters)  Note: GFR calculation is accurate only with a steady state creatinine   URINE MICROSCOPIC - Abnormal    RBC, UA 2-4 (*) None Seen, 0-5 /hpf Final    WBC, UA 2-4 (*) None Seen, 0-5, 5-55, 5-65 /hpf Final    Epithelial Cells Moderate (*) None Seen, Occasional /hpf Final    Bacteria, UA Occasional  None Seen, Occasional /hpf Final    Hyaline Casts, UA None Seen  None Seen /lpf Final   ED URINE MACROSCOPIC - Abnormal    Color, UA Yellow   Final Clarity, UA Clear   Final    pH, UA 6 0  4 5 - 8 0 Final    Leukocytes, UA Negative  Negative Final    Nitrite, UA Negative  Negative Final    Protein, UA Negative  Negative mg/dl Final    Glucose, UA Negative  Negative mg/dl Final    Ketones, UA Negative  Negative mg/dl Final    Urobilinogen, UA 0 2  0 2, 1 0 E U /dl E U /dl Final    Bilirubin, UA Negative  Negative Final    Blood, UA Trace (*) Negative Final    Specific [place], UA 1 015  1 003 - 1 030 Final    Narrative:     CLINITEK RESULT   LIPASE - Normal    Lipase 95  73 - 393 u/L Final   POCT PREGNANCY, URINE - Normal    EXT PREG TEST UR (Ref: Negative) negative   Final    Control valid   Final   POCT URINALYSIS DIPSTICK - Normal    Color, UA see results   Final     Time reflects when diagnosis was documented in both MDM as applicable and the Disposition within this note     Time User Action Codes Description Comment    8/7/2019  3:44 AM Zheng Nelson Add [R10 2] Suprapubic abdominal pain       ED Disposition     ED Disposition Condition Date/Time Comment    Discharge Stable Wed Aug 7, 2019  3:44 AM Madelin Treviño discharge to home/self care  Follow-up Information     Follow up With Specialties Details Why [address] [facility] Emergency Department Emergency Medicine  If pain significantly worsens, profuse vomiting   [address]  [phone]  ED, [address] [place] [address], [facility]    Francheska Landry MD Family Medicine  As needed [address]  [address] [place] [address] Road [address] [place] [address]   [phone]           Discharge Medication List as of 8/7/2019  3:45 AM      CONTINUE these medications which have NOT CHANGED    Details   !! acetaminophen (TYLENOL) 325 mg tablet Take 650 mg by mouth every 6 (six) hours as needed for mild pain, Historical Med      !! ibuprofen (MOTRIN) 600 mg tablet Take by mouth every 6 (six) hours as needed for mild pain, Historical Med      !! acetaminophen (TYLENOL) 500 mg tablet Take 1 tablet (500 mg total) by mouth every 6 (six) hours as needed for mild pain, Starting Wed 5/23/2018, Print      aspirin-acetaminophen-caffeine (EXCEDRIN MIGRAINE) 250-250-65 MG per tablet Take 1 tablet by mouth every 6 (six) hours as needed for headaches, Historical Med      !! ibuprofen (MOTRIN) 400 mg tablet Take 1 tablet (400 mg total) by mouth every 6 (six) hours as needed for mild pain, Starting Wed 5/23/2018, Print      !! ibuprofen (MOTRIN) 600 mg tablet Take 1 tablet (600 mg total) by mouth every 6 (six) hours as needed for mild pain, Starting Fri 5/17/2019, Print       !! - Potential duplicate medications found  Please discuss with provider  No discharge procedures on file  Prior to Admission Medications   Prescriptions Last Dose Informant Patient Reported? Taking?   acetaminophen (TYLENOL) 325 mg tablet   Yes Yes   Sig: Take 650 mg by mouth every 6 (six) hours as needed for mild pain   acetaminophen (TYLENOL) 500 mg tablet   No No   Sig: Take 1 tablet (500 mg total) by mouth every 6 (six) hours as needed for mild pain   aspirin-acetaminophen-caffeine (EXCEDRIN MIGRAINE) 250-250-65 MG per tablet   Yes No   Sig: Take 1 tablet by mouth every 6 (six) hours as needed for headaches   ibuprofen (MOTRIN) 400 mg tablet   No No   Sig: Take 1 tablet (400 mg total) by mouth every 6 (six) hours as needed for mild pain   ibuprofen (MOTRIN) 600 mg tablet   Yes Yes   Sig: Take by mouth every 6 (six) hours as needed for mild pain   ibuprofen (MOTRIN) 600 mg tablet   No No   Sig: Take 1 tablet (600 mg total) by mouth every 6 (six) hours as needed for mild pain      Facility-Administered Medications: None       Portions of the record may have been created with voice recognition software  Occasional wrong word or "sound a like" substitutions may have occurred due to the inherent limitations of voice recognition software   Read the chart carefully and recognize, using context, where substitutions have occurred      Electronically signed by:  Chante Candelario

## 2019-08-08 LAB
C TRACH DNA SPEC QL NAA+PROBE: NEGATIVE
N GONORRHOEA DNA SPEC QL NAA+PROBE: NEGATIVE

## 2019-08-08 NOTE — ED PROVIDER NOTES
History  Chief Complaint   Patient presents with    Abdominal Pain     Per patient, has pain in lower abdominal radiating to flank on right side  Started a week ago, initally had vomiting and fever, that has subsided  Today increased pain, decreased urinary frequency  Patient is a 73-year-old female that presents with abdominal pain  Patient says that she has had 3 days of worsening cramping abdominal pain  Pain is located over suprapubic region and radiates around her right flank  Pain is moderate severity and patient has not taken anything for it  The pain is intermittent and worsens spontaneously  No known triggers of the pain  She endorses nausea with 1 episode of nonbloody, nonbilious emesis  She denies urinary symptoms including hematuria, dysuria  She does have a history of nephrolithiasis and pyelonephritis but she says that this pain is different  Last menstrual period was over 6 months ago, the patient has regular menstruation at baseline  She does endorse some spotting several days ago  She endorses 1 episode of abnormal vaginal brownish discharge several days ago as well  She denies any bowel symptoms including diarrhea, melena, hematochezia  Patient is currently sexually active and does not use protection  No history of chlamydia or gonorrhea  Patient has had 2 pregnancies that have been unremarkable  History of cholecystectomy  Prior to Admission Medications   Prescriptions Last Dose Informant Patient Reported?  Taking?   acetaminophen (TYLENOL) 325 mg tablet   Yes Yes   Sig: Take 650 mg by mouth every 6 (six) hours as needed for mild pain   acetaminophen (TYLENOL) 500 mg tablet   No No   Sig: Take 1 tablet (500 mg total) by mouth every 6 (six) hours as needed for mild pain   aspirin-acetaminophen-caffeine (EXCEDRIN MIGRAINE) 250-250-65 MG per tablet   Yes No   Sig: Take 1 tablet by mouth every 6 (six) hours as needed for headaches   ibuprofen (MOTRIN) 400 mg tablet   No No   Sig: Take 1 tablet (400 mg total) by mouth every 6 (six) hours as needed for mild pain   ibuprofen (MOTRIN) 600 mg tablet   Yes Yes   Sig: Take by mouth every 6 (six) hours as needed for mild pain   ibuprofen (MOTRIN) 600 mg tablet   No No   Sig: Take 1 tablet (600 mg total) by mouth every 6 (six) hours as needed for mild pain      Facility-Administered Medications: None       Past Medical History:   Diagnosis Date    Migraine        Past Surgical History:   Procedure Laterality Date    CHOLECYSTECTOMY         History reviewed  No pertinent family history  I have reviewed and agree with the history as documented  Social History     Tobacco Use    Smoking status: Never Smoker    Smokeless tobacco: Never Used   Substance Use Topics    Alcohol use: No    Drug use: No        Review of Systems   Constitutional: Negative for chills, diaphoresis, fatigue and fever  HENT: Negative for facial swelling, sore throat and trouble swallowing  Respiratory: Negative for cough, chest tightness, shortness of breath and wheezing  Cardiovascular: Negative for chest pain  Gastrointestinal: Positive for abdominal pain, nausea and vomiting  Negative for abdominal distention and diarrhea  Genitourinary: Negative for dysuria  Musculoskeletal: Negative for back pain, neck pain and neck stiffness  Skin: Negative for color change, pallor, rash and wound  Neurological: Negative for weakness, light-headedness and numbness  Psychiatric/Behavioral: Negative for agitation  All other systems reviewed and are negative        Physical Exam  ED Triage Vitals   Temperature Pulse Respirations Blood Pressure SpO2   08/06/19 2312 08/06/19 2312 08/06/19 2312 08/06/19 2312 08/06/19 2312   98 °F (36 7 °C) 88 20 (!) 167/116 100 %      Temp Source Heart Rate Source Patient Position - Orthostatic VS BP Location FiO2 (%)   08/06/19 2312 08/06/19 2312 08/07/19 0249 08/07/19 0249 --   Oral Monitor Lying Right arm       Pain Score 08/06/19 2312       9             Orthostatic Vital Signs  Vitals:    08/06/19 2312 08/07/19 0249   BP: (!) 167/116 120/69   Pulse: 88 74   Patient Position - Orthostatic VS:  Lying       Physical Exam   Constitutional: She is oriented to person, place, and time  She appears well-developed and well-nourished  No distress  HENT:   Head: Normocephalic  Eyes: Pupils are equal, round, and reactive to light  Neck: Normal range of motion  Neck supple  Cardiovascular: Normal rate, regular rhythm, normal heart sounds and intact distal pulses  Pulmonary/Chest: Effort normal and breath sounds normal    Abdominal: Soft  Bowel sounds are normal  She exhibits no distension  There is no tenderness  There is no guarding  Mild suprapubic and right lower quadrant abdominal tenderness without rebound or guarding   Genitourinary:   Genitourinary Comments: Pelvic exam:  Patient with no tenderness bimanual exam   Normal speculum exam    Musculoskeletal: Normal range of motion  She exhibits tenderness  She exhibits no edema or deformity  Mild right CVA tenderness   Neurological: She is alert and oriented to person, place, and time  No cranial nerve deficit or sensory deficit  Skin: Skin is warm and dry  Capillary refill takes less than 2 seconds  Psychiatric: She has a normal mood and affect  Her behavior is normal  Judgment and thought content normal    Vitals reviewed  ED Medications  Medications   ondansetron (ZOFRAN) injection 4 mg (4 mg Intravenous Given 8/6/19 2347)   ketorolac (TORADOL) injection 15 mg (15 mg Intravenous Given 8/6/19 2350)   iohexol (OMNIPAQUE) 350 MG/ML injection (MULTI-DOSE) 100 mL (100 mL Intravenous Given 8/7/19 0238)       Diagnostic Studies  Results Reviewed     Procedure Component Value Units Date/Time    Chlamydia/GC amplified DNA by PCR [[id number]] Collected:  08/07/19 0401    Lab Status:   In process Specimen:  Genital from Vaginal Updated:  08/07/19 0401    Comprehensive metabolic panel [[id number]]  (Abnormal) Collected:  08/06/19 2347    Lab Status:  Final result Specimen:  Blood from Arm, Left Updated:  08/07/19 0012     Sodium 144 mmol/L      Potassium 3 7 mmol/L      Chloride 108 mmol/L      CO2 28 mmol/L      ANION GAP 8 mmol/L      BUN 13 mg/dL      Creatinine 0 98 mg/dL      Glucose 100 mg/dL      Calcium 9 2 mg/dL      AST 17 U/L      ALT 43 U/L      Alkaline Phosphatase 97 U/L      Total Protein 8 7 g/dL      Albumin 4 0 g/dL      Total Bilirubin 0 37 mg/dL      eGFR 79 ml/min/1 73sq m     Narrative:       [place] guidelines for Chronic Kidney Disease (CKD):     Stage 1 with normal or high GFR (GFR > 90 mL/min/1 73 square meters)    Stage 2 Mild CKD (GFR = 60-89 mL/min/1 73 square meters)    Stage 3A Moderate CKD (GFR = 45-59 mL/min/1 73 square meters)    Stage 3B Moderate CKD (GFR = 30-44 mL/min/1 73 square meters)    Stage 4 Severe CKD (GFR = 15-29 mL/min/1 73 square meters)    Stage 5 End Stage CKD (GFR <15 mL/min/1 73 square meters)  Note: GFR calculation is accurate only with a steady state creatinine    Lipase [[id number]]  (Normal) Collected:  08/06/19 2347    Lab Status:  Final result Specimen:  Blood from Arm, Left Updated:  08/07/19 0012     Lipase 95 u/L     Urine Microscopic [[id number]]  (Abnormal) Collected:  08/06/19 2346    Lab Status:  Final result Specimen:  Urine, Clean Catch Updated:  08/07/19 0003     RBC, UA 2-4 /hpf      WBC, UA 2-4 /hpf      Epithelial Cells Moderate /hpf      Bacteria, UA Occasional /hpf      Hyaline Casts, UA None Seen /lpf     CBC and differential [[id number]]  (Abnormal) Collected:  08/06/19 2347    Lab Status:  Final result Specimen:  Blood from Arm, Left Updated:  08/06/19 2356     WBC 11 28 Thousand/uL      RBC 4 33 Million/uL      Hemoglobin 12 6 g/dL      Hematocrit 39 7 %      MCV 92 fL      MCH 29 1 pg      MCHC 31 7 g/dL      RDW 12 3 %      MPV 10 7 fL      Platelets 035 Thousands/uL      nRBC 0 /100 WBCs      Neutrophils Relative 66 %      Immat GRANS % 0 %      Lymphocytes Relative 24 %      Monocytes Relative 9 %      Eosinophils Relative 1 %      Basophils Relative 0 %      Neutrophils Absolute 7 37 Thousands/µL      Immature Grans Absolute 0 05 Thousand/uL      Lymphocytes Absolute 2 70 Thousands/µL      Monocytes Absolute 1 04 Thousand/µL      Eosinophils Absolute 0 07 Thousand/µL      Basophils Absolute 0 05 Thousands/µL     POCT urinalysis dipstick [[id number]]  (Normal) Resulted:  08/06/19 2341    Lab Status:  Final result Updated:  08/06/19 2341     Color, UA see results    POCT pregnancy, urine [[id number]]  (Normal) Resulted:  08/06/19 2341    Lab Status:  Final result Updated:  08/06/19 2341     EXT PREG TEST UR (Ref: Negative) negative     Control valid    ED Urine Macroscopic [[id number]]  (Abnormal) Collected:  08/06/19 2346    Lab Status:  Final result Specimen:  Urine Updated:  08/06/19 2341     Color, UA Yellow     Clarity, UA Clear     pH, UA 6 0     Leukocytes, UA Negative     Nitrite, UA Negative     Protein, UA Negative mg/dl      Glucose, UA Negative mg/dl      Ketones, UA Negative mg/dl      Urobilinogen, UA 0 2 E U /dl      Bilirubin, UA Negative     Blood, UA Trace     Specific [place], UA 1 015    Narrative:       CLINITEK RESULT                 CT abdomen pelvis with contrast   Final Result by Samantha Hyde MD (08/07 4621)      Hepatic steatosis  Workstation performed: [id number]               Procedures  Procedures        ED Course                               MDM  Number of Diagnoses or Management Options  Suprapubic abdominal pain: new and does not require workup  Diagnosis management comments: Patient is a 61-year-old female who presents with suprapubic abdominal pain with negative workup  Workup including CBC, CMP, lipase, urine pregnancy, urinalysis, CT and pelvis were all negative  Pelvic exam was negative for any cervicitis/pelvic inflammatory disease    Patient was advised to use Motrin and Tylenol for pain and follow up with PCP moving forward  She is advised to return to care if she has worsening symptoms  Amount and/or Complexity of Data Reviewed  Clinical lab tests: reviewed and ordered  Tests in the radiology section of CPT®: ordered and reviewed    Risk of Complications, Morbidity, and/or Mortality  Presenting problems: low  Diagnostic procedures: low  Management options: low    Patient Progress  Patient progress: improved      Disposition  Final diagnoses:   Suprapubic abdominal pain     Time reflects when diagnosis was documented in both MDM as applicable and the Disposition within this note     Time User Action Codes Description Comment    8/7/2019  3:44 AM Josephine Sarmiento Add [R10 2] Suprapubic abdominal pain       ED Disposition     ED Disposition Condition Date/Time Comment    Discharge Stable Wed Aug 7, 2019  3:44 AM Robert Morales discharge to home/self care  Follow-up Information     Follow up With Specialties Details Why [address] [facility] Emergency Department Emergency Medicine  If pain significantly worsens, profuse vomiting   [address]  [phone]  ED, [address] [place] [address], [id number]    Sari Gonzales MD Family Medicine  As needed [address] [facility] 112 [address] [facility]   [phone]             Discharge Medication List as of 8/7/2019  3:45 AM      CONTINUE these medications which have NOT CHANGED    Details   !! acetaminophen (TYLENOL) 325 mg tablet Take 650 mg by mouth every 6 (six) hours as needed for mild pain, Historical Med      !! ibuprofen (MOTRIN) 600 mg tablet Take by mouth every 6 (six) hours as needed for mild pain, Historical Med      !! acetaminophen (TYLENOL) 500 mg tablet Take 1 tablet (500 mg total) by mouth every 6 (six) hours as needed for mild pain, Starting Wed 5/23/2018, Print aspirin-acetaminophen-caffeine (EXCEDRIN MIGRAINE) 250-250-65 MG per tablet Take 1 tablet by mouth every 6 (six) hours as needed for headaches, Historical Med      !! ibuprofen (MOTRIN) 400 mg tablet Take 1 tablet (400 mg total) by mouth every 6 (six) hours as needed for mild pain, Starting Wed 5/23/2018, Print      !! ibuprofen (MOTRIN) 600 mg tablet Take 1 tablet (600 mg total) by mouth every 6 (six) hours as needed for mild pain, Starting Fri 5/17/2019, Print       !! - Potential duplicate medications found  Please discuss with provider  No discharge procedures on file  ED Provider  Attending physically available and evaluated Malcolm Kincaid I managed the patient along with the ED Attending      Electronically Signed by         Lisa Craft MD  08/08/19 9422

## 2019-11-22 NOTE — ED ATTENDING ATTESTATION
Jet Cortez MD, saw and evaluated the patient  I have discussed the patient with the resident/non-physician practitioner and agree with the resident's/non-physician practitioner's findings, Plan of Care, and MDM as documented in the resident's/non-physician practitioner's note, except where noted  All available labs and Radiology studies were reviewed  At this point I agree with the current assessment done in the Emergency Department  I have conducted an independent evaluation of this patient including a focused history of:    Emergency Department Note- Arnold Garzon 33 y o  female MRN: [id number]    Unit/Bed#: ED 18 Encounter: [id number]    Arnold Garzon is a 33 y o  female who presents with   Chief Complaint   Patient presents with    Migraine     pt reports migraine starting this morning that is worse than she has had before  reports nausea and right eye pain         History of Present Illness   HPI:  Arnold Garzon is a 33 y o  female who presents for evaluation of:  Gradual onset of headache  The patient has been having intermittent headaches over the last month  Today's headache started in the morning and progressed throughout the day  Today's headache is a little worse than usual   Typically the patient's headaches are posterior an occipital   Today's headache is bilateral and frontal   The headache seems to be focus behind the right eye  The discomfort is of moderate intensity  The patient took ibuprofen at home with some relief  The patient denies associated fevers, nausea, vomiting, neck pain, rashes, and dyspnea  The patient has a family history that is notable for cerebral aneurysms in a parent and a sibling  Review of Systems   Constitutional: Negative for fatigue and fever  HENT: Negative for congestion, sinus pain and sinus pressure  Respiratory: Negative for cough and shortness of breath  Cardiovascular: Positive for chest pain (Eight days ago)   Negative for Spoke with pt. Notified pt her B1 was low, she denied any neurological changes, instructed her to take a Super B Complex with 50mg thiamine today and this weekend. Will make appointments for M/T/W for Thiamine injections as early as possible. Patient instructed to go to ER with any neurological changes for further workup. Will recheck lab in 1 week.  Vit D also low, informed her script was sent to [facility]. Patient verbalized she will start medication today. We will recheck level in 12 weeks.   Patient instructed to start a MVI with iron and to f/u with her PCP regarding long standing anemia.   Patient verbalized understanding of all of the above and verbalized she would be checking portal.      palpitations  Neurological: Positive for headaches  Negative for syncope and light-headedness  Psychiatric/Behavioral: Positive for dysphoric mood  The patient is nervous/anxious  All other systems reviewed and are negative  Historical Information   Past Medical History:   Diagnosis Date    Migraine      Past Surgical History:   Procedure Laterality Date    CHOLECYSTECTOMY       Social History   History   Alcohol Use No     History   Drug Use No     History   Smoking Status    Never Smoker   Smokeless Tobacco    Never Used     Family History: non-contributory    Meds/Allergies   all medications and allergies reviewed  Allergies   Allergen Reactions    Latex Rash       Objective   First Vitals:   Blood Pressure: (!) 166/102 (05/23/18 0018)  Pulse: 95 (05/23/18 0018)  Temperature: 98 5 °F (36 9 °C) (05/23/18 0018)  Temp Source: Tympanic (05/23/18 0018)  Respirations: 16 (05/23/18 0018)  Weight - Scale: 95 3 kg (210 lb) (05/23/18 0018)  SpO2: 100 % (05/23/18 0018)    Current Vitals:   Blood Pressure: (!) 166/102 (05/23/18 0018)  Pulse: 95 (05/23/18 0018)  Temperature: 98 5 °F (36 9 °C) (05/23/18 0018)  Temp Source: Tympanic (05/23/18 0018)  Respirations: 16 (05/23/18 0018)  Weight - Scale: 95 3 kg (210 lb) (05/23/18 0018)  SpO2: 100 % (05/23/18 0018)      Intake/Output Summary (Last 24 hours) at 05/23/18 0211  Last data filed at 05/23/18 0110   Gross per 24 hour   Intake              200 ml   Output                0 ml   Net              200 ml       Invasive Devices     Peripheral Intravenous Line            Peripheral IV 05/23/18 Left Antecubital less than 1 day                Physical Exam   HENT:   Head: Normocephalic and atraumatic  Abdominal: Soft  Bowel sounds are normal    Musculoskeletal: Normal range of motion  She exhibits no deformity  Skin: Skin is warm and dry  Psychiatric: She has a normal mood and affect   Her behavior is normal  Judgment and thought content normal    Nursing note and vitals reviewed  Medical Decision Makin  Acute cephalgia:  Plan to obtain a CT scan of the brain to rule out intracranial hemorrhage; plan to obtain a CT scan angiogram of the brain to rule out cerebral aneurysm      Recent Results (from the past 36 hour(s))   CBC and differential    Collection Time: 18 12:49 AM   Result Value Ref Range    WBC 10 02 4 31 - 10 16 Thousand/uL    RBC 4 29 3 81 - 5 12 Million/uL    Hemoglobin 12 7 11 5 - 15 4 g/dL    Hematocrit 37 8 34 8 - 46 1 %    MCV 88 82 - 98 fL    MCH 29 6 26 8 - 34 3 pg    MCHC 33 6 31 4 - 37 4 g/dL    RDW 13 4 11 6 - 15 1 %    MPV 10 8 8 9 - 12 7 fL    Platelets 909 144 - 940 Thousands/uL    nRBC 0 /100 WBCs    Neutrophils Relative 58 43 - 75 %    Lymphocytes Relative 30 14 - 44 %    Monocytes Relative 11 4 - 12 %    Eosinophils Relative 1 0 - 6 %    Basophils Relative 0 0 - 1 %    Neutrophils Absolute 5 79 1 85 - 7 62 Thousands/µL    Lymphocytes Absolute 3 02 0 60 - 4 47 Thousands/µL    Monocytes Absolute 1 08 0 17 - 1 22 Thousand/µL    Eosinophils Absolute 0 08 0 00 - 0 61 Thousand/µL    Basophils Absolute 0 04 0 00 - 0 10 Thousands/µL   Comprehensive metabolic panel    Collection Time: 18 12:49 AM   Result Value Ref Range    Sodium 138 136 - 145 mmol/L    Potassium 3 6 3 5 - 5 3 mmol/L    Chloride 104 100 - 108 mmol/L    CO2 29 21 - 32 mmol/L    Anion Gap 5 4 - 13 mmol/L    BUN 16 5 - 25 mg/dL    Creatinine 0 85 0 60 - 1 30 mg/dL    Glucose 95 65 - 140 mg/dL    Calcium 8 7 8 3 - 10 1 mg/dL    AST 26 5 - 45 U/L    ALT 56 12 - 78 U/L    Alkaline Phosphatase 107 46 - 116 U/L    Total Protein 7 9 6 4 - 8 2 g/dL    Albumin 3 6 3 5 - 5 0 g/dL    Total Bilirubin 0 36 0 20 - 1 00 mg/dL    eGFR 95 ml/min/1 73sq m   POCT pregnancy, urine    Collection Time: 18 12:57 AM   Result Value Ref Range    EXT PREG TEST UR (Ref: Negative) Negative    POCT Chem 8+    Collection Time: 18 12:58 AM   Result Value Ref Range    SODIUM, I-STAT 140 136 - 145 mmol/l    Potassium, i-STAT 3 6 3 5 - 5 3 mmol/L    Chloride, istat 102 100 - 108 mmol/L    CO2, i-STAT 28 21 - 32 mmol/L    Anion Gap, Istat 15 (H) 4 - 13 mmol/L    Calcium, Ionized i-STAT 1 16 1 12 - 1 32 mmol/L    BUN, I-STAT 15 5 - 25 mg/dl    Creatinine, i-STAT 0 8 0 6 - 1 3 mg/dl    eGFR 102 ml/min/1 73sq m    Glucose, i-STAT 95 65 - 140 mg/dl    Hct, i-STAT 38 34 8 - 46 1 %    Hgb, i-STAT 12 9 11 5 - 15 4 g/dl    Specimen Type VENOUS      CTA head and neck with and without contrast    (Results Pending)         Portions of the record may have been created with voice recognition software  Occasional wrong word or "sound a like" substitutions may have occurred due to the inherent limitations of voice recognition software  Read the chart carefully and recognize, using context, where substitutions have occurred